# Patient Record
Sex: MALE | Race: WHITE | NOT HISPANIC OR LATINO
[De-identification: names, ages, dates, MRNs, and addresses within clinical notes are randomized per-mention and may not be internally consistent; named-entity substitution may affect disease eponyms.]

---

## 2017-01-23 ENCOUNTER — CHART COPY (OUTPATIENT)
Age: 72
End: 2017-01-23

## 2017-01-23 RX ORDER — OXYCODONE AND ACETAMINOPHEN 5; 325 MG/1; MG/1
5-325 TABLET ORAL
Qty: 60 | Refills: 0 | Status: ACTIVE | COMMUNITY
Start: 2017-01-23 | End: 1900-01-01

## 2021-01-12 ENCOUNTER — NON-APPOINTMENT (OUTPATIENT)
Age: 76
End: 2021-01-12

## 2021-01-12 DIAGNOSIS — K31.7 POLYP OF STOMACH AND DUODENUM: ICD-10-CM

## 2021-02-05 ENCOUNTER — APPOINTMENT (OUTPATIENT)
Dept: DISASTER EMERGENCY | Facility: CLINIC | Age: 76
End: 2021-02-05

## 2021-02-05 ENCOUNTER — TRANSCRIPTION ENCOUNTER (OUTPATIENT)
Age: 76
End: 2021-02-05

## 2021-02-06 NOTE — PRE PROCEDURE NOTE - PRE PROCEDURE EVALUATION
Attending Physician:      Alejandra                     Procedure: EUS    Indication for Procedure: gastric polyp  ________________________________________________________  PAST MEDICAL & SURGICAL HISTORY:  Hyperlipidemia    Shoulder pain, left    GERD (gastroesophageal reflux disease)    Hypertension    S/P T&amp;A (status post tonsillectomy and adenoidectomy)    S/P appendectomy  1953    S/P TURP  2008    S/P colonoscopy  negative      ALLERGIES:  No Known Allergies    HOME MEDICATIONS:  amLODIPine 5 mg oral tablet: 1 tab(s) orally once a day  omeprazole 40 mg oral delayed release capsule: 1 cap(s) orally once a day  pravastatin 40 mg oral tablet: 1 tab(s) orally once a day  Vitamin D3 400 intl units oral tablet: 1 tab(s) orally once a day    AICD/PPM: [ ] yes   [ ] no    PERTINENT LAB DATA:                      PHYSICAL EXAMINATION:    T(C): --  HR: --  BP: --  RR: --  SpO2: --    Constitutional: NAD  HEENT: PERRLA, EOMI,    Neck:  No JVD  Respiratory: CTAB/L  Cardiovascular: S1 and S2  Gastrointestinal: BS+, soft, NT/ND  Extremities: No peripheral edema  Neurological: A/O x 3, no focal deficits  Psychiatric: Normal mood, normal affect  Skin: No rashes    ASA Class: I [ ]  II [ ]  III [ ]  IV [ ]    COMMENTS:    The patient is a suitable candidate for the planned procedure unless box checked [ ]  No, explain:

## 2021-02-07 LAB — SARS-COV-2 N GENE NPH QL NAA+PROBE: NOT DETECTED

## 2021-02-08 ENCOUNTER — RESULT REVIEW (OUTPATIENT)
Age: 76
End: 2021-02-08

## 2021-02-08 ENCOUNTER — OUTPATIENT (OUTPATIENT)
Dept: OUTPATIENT SERVICES | Facility: HOSPITAL | Age: 76
LOS: 1 days | End: 2021-02-08
Payer: COMMERCIAL

## 2021-02-08 ENCOUNTER — APPOINTMENT (OUTPATIENT)
Dept: GASTROENTEROLOGY | Facility: HOSPITAL | Age: 76
End: 2021-02-08

## 2021-02-08 VITALS
HEART RATE: 81 BPM | DIASTOLIC BLOOD PRESSURE: 80 MMHG | OXYGEN SATURATION: 100 % | SYSTOLIC BLOOD PRESSURE: 116 MMHG | RESPIRATION RATE: 15 BRPM

## 2021-02-08 VITALS
RESPIRATION RATE: 20 BRPM | HEIGHT: 76 IN | SYSTOLIC BLOOD PRESSURE: 146 MMHG | OXYGEN SATURATION: 97 % | WEIGHT: 233.03 LBS | DIASTOLIC BLOOD PRESSURE: 89 MMHG | TEMPERATURE: 97 F | HEART RATE: 76 BPM

## 2021-02-08 DIAGNOSIS — K31.7 POLYP OF STOMACH AND DUODENUM: ICD-10-CM

## 2021-02-08 DIAGNOSIS — Z90.89 ACQUIRED ABSENCE OF OTHER ORGANS: Chronic | ICD-10-CM

## 2021-02-08 DIAGNOSIS — Z98.89 OTHER SPECIFIED POSTPROCEDURAL STATES: Chronic | ICD-10-CM

## 2021-02-08 DIAGNOSIS — Z90.79 ACQUIRED ABSENCE OF OTHER GENITAL ORGAN(S): Chronic | ICD-10-CM

## 2021-02-08 DIAGNOSIS — Z90.49 ACQUIRED ABSENCE OF OTHER SPECIFIED PARTS OF DIGESTIVE TRACT: Chronic | ICD-10-CM

## 2021-02-08 PROCEDURE — 88305 TISSUE EXAM BY PATHOLOGIST: CPT | Mod: 26

## 2021-02-08 PROCEDURE — 43251 EGD REMOVE LESION SNARE: CPT

## 2021-02-08 PROCEDURE — 43251 EGD REMOVE LESION SNARE: CPT | Mod: GC,59

## 2021-02-08 PROCEDURE — 88305 TISSUE EXAM BY PATHOLOGIST: CPT

## 2021-02-08 PROCEDURE — 43237 ENDOSCOPIC US EXAM ESOPH: CPT

## 2021-02-08 PROCEDURE — 43259 EGD US EXAM DUODENUM/JEJUNUM: CPT | Mod: GC

## 2021-02-08 RX ORDER — SODIUM CHLORIDE 9 MG/ML
500 INJECTION INTRAMUSCULAR; INTRAVENOUS; SUBCUTANEOUS
Refills: 0 | Status: COMPLETED | OUTPATIENT
Start: 2021-02-08 | End: 2021-02-08

## 2021-02-08 RX ADMIN — SODIUM CHLORIDE 30 MILLILITER(S): 9 INJECTION INTRAMUSCULAR; INTRAVENOUS; SUBCUTANEOUS at 14:00

## 2021-02-08 NOTE — PRE-ANESTHESIA EVALUATION ADULT - NSANTHOSAYNRD_GEN_A_CORE
No. AMADO screening performed.  STOP BANG Legend: 0-2 = LOW Risk; 3-4 = INTERMEDIATE Risk; 5-8 = HIGH Risk

## 2021-02-08 NOTE — ASU PATIENT PROFILE, ADULT - PSH
S/P appendectomy  1953  S/P colonoscopy  negative  S/P T&A (status post tonsillectomy and adenoidectomy)    S/P TURP  2008

## 2021-02-10 LAB — SURGICAL PATHOLOGY STUDY: SIGNIFICANT CHANGE UP

## 2021-02-16 ENCOUNTER — NON-APPOINTMENT (OUTPATIENT)
Age: 76
End: 2021-02-16

## 2022-08-30 NOTE — PRE-ANESTHESIA EVALUATION ADULT - NSPREOPDXFT_GEN_ALL_CORE
gastric nodules Detail Level: Zone Plan: I recommend broadspectrum sunscreen containing physical blockers such as zinc oxide / titanium dioxide at least SPF 30.

## 2023-01-04 ENCOUNTER — NON-APPOINTMENT (OUTPATIENT)
Age: 78
End: 2023-01-04

## 2023-09-14 ENCOUNTER — OUTPATIENT (OUTPATIENT)
Dept: OUTPATIENT SERVICES | Facility: HOSPITAL | Age: 78
LOS: 1 days | End: 2023-09-14
Payer: COMMERCIAL

## 2023-09-14 DIAGNOSIS — Z98.89 OTHER SPECIFIED POSTPROCEDURAL STATES: Chronic | ICD-10-CM

## 2023-09-14 DIAGNOSIS — Z90.89 ACQUIRED ABSENCE OF OTHER ORGANS: Chronic | ICD-10-CM

## 2023-09-14 DIAGNOSIS — Z90.79 ACQUIRED ABSENCE OF OTHER GENITAL ORGAN(S): Chronic | ICD-10-CM

## 2023-09-14 DIAGNOSIS — R00.2 PALPITATIONS: ICD-10-CM

## 2023-09-14 DIAGNOSIS — Z90.49 ACQUIRED ABSENCE OF OTHER SPECIFIED PARTS OF DIGESTIVE TRACT: Chronic | ICD-10-CM

## 2023-09-14 DIAGNOSIS — R00.1 BRADYCARDIA, UNSPECIFIED: ICD-10-CM

## 2023-09-14 PROCEDURE — 93016 CV STRESS TEST SUPVJ ONLY: CPT

## 2023-09-14 PROCEDURE — 93018 CV STRESS TEST I&R ONLY: CPT

## 2023-09-14 PROCEDURE — 93017 CV STRESS TEST TRACING ONLY: CPT

## 2024-08-01 ENCOUNTER — TRANSCRIPTION ENCOUNTER (OUTPATIENT)
Age: 79
End: 2024-08-01

## 2024-08-02 ENCOUNTER — OUTPATIENT (OUTPATIENT)
Dept: OUTPATIENT SERVICES | Facility: HOSPITAL | Age: 79
LOS: 1 days | End: 2024-08-02
Payer: COMMERCIAL

## 2024-08-02 DIAGNOSIS — Z90.79 ACQUIRED ABSENCE OF OTHER GENITAL ORGAN(S): Chronic | ICD-10-CM

## 2024-08-02 DIAGNOSIS — Z90.89 ACQUIRED ABSENCE OF OTHER ORGANS: Chronic | ICD-10-CM

## 2024-08-02 DIAGNOSIS — M17.0 BILATERAL PRIMARY OSTEOARTHRITIS OF KNEE: ICD-10-CM

## 2024-08-02 DIAGNOSIS — Z90.49 ACQUIRED ABSENCE OF OTHER SPECIFIED PARTS OF DIGESTIVE TRACT: Chronic | ICD-10-CM

## 2024-08-02 DIAGNOSIS — Z98.89 OTHER SPECIFIED POSTPROCEDURAL STATES: Chronic | ICD-10-CM

## 2024-08-02 PROCEDURE — 77002 NEEDLE LOCALIZATION BY XRAY: CPT

## 2024-08-02 PROCEDURE — 20610 DRAIN/INJ JOINT/BURSA W/O US: CPT | Mod: 50

## 2024-08-06 ENCOUNTER — APPOINTMENT (OUTPATIENT)
Dept: PULMONOLOGY | Facility: CLINIC | Age: 79
End: 2024-08-06

## 2024-08-06 PROBLEM — Z83.3 FAMILY HISTORY OF DIABETES MELLITUS: Status: ACTIVE | Noted: 2024-08-06

## 2024-08-06 PROBLEM — Z86.39 HISTORY OF HYPERCHOLESTEROLEMIA: Status: ACTIVE | Noted: 2024-08-06

## 2024-08-06 PROBLEM — R06.83 SNORING: Status: ACTIVE | Noted: 2024-08-06

## 2024-08-06 PROBLEM — K21.9 CHRONIC GERD: Status: ACTIVE | Noted: 2024-08-06

## 2024-08-06 PROBLEM — I10 BENIGN ESSENTIAL HTN: Status: ACTIVE | Noted: 2024-08-06

## 2024-08-06 PROBLEM — G47.19 EXCESSIVE DAYTIME SLEEPINESS: Status: ACTIVE | Noted: 2024-08-06

## 2024-08-06 PROBLEM — Z72.821 POOR SLEEP HYGIENE: Status: ACTIVE | Noted: 2024-08-06

## 2024-08-06 PROBLEM — Z82.5 FAMILY HISTORY OF EMPHYSEMA: Status: ACTIVE | Noted: 2024-08-06

## 2024-08-06 PROBLEM — Z87.891 FORMER SMOKER: Status: ACTIVE | Noted: 2024-08-06

## 2024-08-06 PROBLEM — Z87.438 HISTORY OF BPH: Status: ACTIVE | Noted: 2024-08-06

## 2024-08-06 PROCEDURE — 99204 OFFICE O/P NEW MOD 45 MIN: CPT

## 2024-08-06 NOTE — DISCUSSION/SUMMARY
[Obstructive Sleep Apnea] : obstructive sleep apnea [Nasal Polyps] : nasal polyps [Deviated Septum] : deviated septum [Turbinate Hypertrophy] : turbinate hypertrophy [Adenoid Hypertrophy] : adenoid hypertrophy [Sleep Study] : sleep study [Alcohol Avoidance] : alcohol avoidance [Sedative Avoidance] : sedative avoidance [Intra-Oral Device] : intra-oral device [de-identified] : HST [de-identified] : with poor sleep [de-identified] : The pathophysiology of sleep was explained to the patient in detail. Inclusive of this was the reasoning behind and the expected response to positive airway pressure therapy. Compliance was outlined including further followup

## 2024-08-06 NOTE — HISTORY OF PRESENT ILLNESS
[Awakes Unrefreshed] : awakes unrefreshed [Awakes with Dry Mouth] : awakes with dry mouth [Awakes with Headache] : awakes with headache [Snoring] : snoring [Witnessed Apneas] : witnessed apneas [TextBox_77] : 2000 [TextBox_79] : 2205 [TextBox_83] : 1-2hrs [TextBox_89] : 2 [TextBox_93] : up for 2-3 hrs during the day, poor sleep hygiene [ESS] : 9

## 2024-09-12 ENCOUNTER — APPOINTMENT (OUTPATIENT)
Dept: PULMONOLOGY | Facility: CLINIC | Age: 79
End: 2024-09-12
Payer: MEDICARE

## 2024-09-12 VITALS
RESPIRATION RATE: 16 BRPM | OXYGEN SATURATION: 97 % | HEART RATE: 74 BPM | DIASTOLIC BLOOD PRESSURE: 70 MMHG | SYSTOLIC BLOOD PRESSURE: 132 MMHG | HEIGHT: 72 IN | BODY MASS INDEX: 31.15 KG/M2 | WEIGHT: 230 LBS

## 2024-09-12 DIAGNOSIS — G47.33 OBSTRUCTIVE SLEEP APNEA (ADULT) (PEDIATRIC): ICD-10-CM

## 2024-09-12 DIAGNOSIS — G47.19 OTHER HYPERSOMNIA: ICD-10-CM

## 2024-09-12 DIAGNOSIS — R06.83 SNORING: ICD-10-CM

## 2024-09-12 DIAGNOSIS — I48.91 UNSPECIFIED ATRIAL FIBRILLATION: ICD-10-CM

## 2024-09-12 PROCEDURE — 99214 OFFICE O/P EST MOD 30 MIN: CPT

## 2024-09-12 NOTE — CONSULT LETTER
[Dear  ___] : Dear  [unfilled], [Consult Letter:] : I had the pleasure of evaluating your patient, [unfilled]. [Please see my note below.] : Please see my note below. [Consult Closing:] : Thank you very much for allowing me to participate in the care of this patient.  If you have any questions, please do not hesitate to contact me. [Sincerely,] : Sincerely, [FreeTextEntry3] : Tylor Galindo MD FCCP Pulmonary/Critical Care/Sleep Medicine Department of Internal Medicine  [facility]

## 2024-09-12 NOTE — HISTORY OF PRESENT ILLNESS
[Awakes Unrefreshed] : awakes unrefreshed [Awakes with Dry Mouth] : awakes with dry mouth [Awakes with Headache] : awakes with headache [Snoring] : snoring [Witnessed Apneas] : witnessed apneas [TextBox_77] : 2000 [TextBox_79] : 8517 [TextBox_83] : 1-2hrs [TextBox_89] : 2 [TextBox_93] : up for 2-3 hrs during the day, poor sleep hygiene [TextBox_100] : 8/15/2024 [TextBox_108] : 7.4 [TextBox_116] : 83% [TextBox_120] : TST 258min, REM 21.6, 1min afib [ESS] : 9

## 2024-09-12 NOTE — DISCUSSION/SUMMARY
[Obstructive Sleep Apnea] : obstructive sleep apnea [Nasal Polyps] : nasal polyps [Deviated Septum] : deviated septum [Turbinate Hypertrophy] : turbinate hypertrophy [Adenoid Hypertrophy] : adenoid hypertrophy [Sleep Study] : sleep study [Alcohol Avoidance] : alcohol avoidance [Sedative Avoidance] : sedative avoidance [Intra-Oral Device] : intra-oral device [de-identified] : with poor sleep [de-identified] : Sleep study poor due to frequent awakenings.  Probable underestimation of patient's sleep apnea [de-identified] : The pathophysiology of sleep was explained to the patient in detail. Inclusive of this was the reasoning behind and the expected response to positive airway pressure therapy. Compliance was outlined including further followup [de-identified] : Attended sleep study [FreeTextEntry1] : Episode of paroxysmal atrial fibrillation noted.  Patient referred back to his cardiologist for further evaluation

## 2024-10-05 ENCOUNTER — NON-APPOINTMENT (OUTPATIENT)
Age: 79
End: 2024-10-05

## 2024-10-24 ENCOUNTER — OUTPATIENT (OUTPATIENT)
Dept: OUTPATIENT SERVICES | Facility: HOSPITAL | Age: 79
LOS: 1 days | End: 2024-10-24
Payer: MEDICARE

## 2024-10-24 DIAGNOSIS — Z98.89 OTHER SPECIFIED POSTPROCEDURAL STATES: Chronic | ICD-10-CM

## 2024-10-24 DIAGNOSIS — Z90.79 ACQUIRED ABSENCE OF OTHER GENITAL ORGAN(S): Chronic | ICD-10-CM

## 2024-10-24 DIAGNOSIS — R06.83 SNORING: ICD-10-CM

## 2024-10-24 DIAGNOSIS — G47.33 OBSTRUCTIVE SLEEP APNEA (ADULT) (PEDIATRIC): ICD-10-CM

## 2024-10-24 DIAGNOSIS — Z90.89 ACQUIRED ABSENCE OF OTHER ORGANS: Chronic | ICD-10-CM

## 2024-10-24 DIAGNOSIS — Z90.49 ACQUIRED ABSENCE OF OTHER SPECIFIED PARTS OF DIGESTIVE TRACT: Chronic | ICD-10-CM

## 2024-10-24 PROCEDURE — 95810 POLYSOM 6/> YRS 4/> PARAM: CPT | Mod: 26

## 2024-10-24 PROCEDURE — 95810 POLYSOM 6/> YRS 4/> PARAM: CPT

## 2024-10-24 RX ORDER — ZOLPIDEM TARTRATE 5 MG/1
5 TABLET ORAL
Qty: 1 | Refills: 1 | Status: ACTIVE | COMMUNITY
Start: 2024-10-24 | End: 1900-01-01

## 2024-11-19 ENCOUNTER — APPOINTMENT (OUTPATIENT)
Dept: PULMONOLOGY | Facility: CLINIC | Age: 79
End: 2024-11-19
Payer: MEDICARE

## 2024-11-19 DIAGNOSIS — G47.33 OBSTRUCTIVE SLEEP APNEA (ADULT) (PEDIATRIC): ICD-10-CM

## 2024-11-19 DIAGNOSIS — Z71.89 OTHER SPECIFIED COUNSELING: ICD-10-CM

## 2024-11-19 PROCEDURE — 99443: CPT

## 2025-01-30 ENCOUNTER — APPOINTMENT (OUTPATIENT)
Dept: ELECTROPHYSIOLOGY | Facility: CLINIC | Age: 80
End: 2025-01-30
Payer: MEDICARE

## 2025-01-30 VITALS
WEIGHT: 230 LBS | HEIGHT: 72 IN | BODY MASS INDEX: 31.15 KG/M2 | HEART RATE: 90 BPM | OXYGEN SATURATION: 97 % | SYSTOLIC BLOOD PRESSURE: 134 MMHG | DIASTOLIC BLOOD PRESSURE: 71 MMHG

## 2025-01-30 DIAGNOSIS — F41.9 ANXIETY DISORDER, UNSPECIFIED: ICD-10-CM

## 2025-01-30 DIAGNOSIS — G89.29 DORSALGIA, UNSPECIFIED: ICD-10-CM

## 2025-01-30 DIAGNOSIS — G47.9 SLEEP DISORDER, UNSPECIFIED: ICD-10-CM

## 2025-01-30 DIAGNOSIS — M54.9 DORSALGIA, UNSPECIFIED: ICD-10-CM

## 2025-01-30 PROCEDURE — 99205 OFFICE O/P NEW HI 60 MIN: CPT | Mod: 25

## 2025-01-30 PROCEDURE — 93000 ELECTROCARDIOGRAM COMPLETE: CPT

## 2025-01-30 RX ORDER — APIXABAN 5 MG/1
5 TABLET, FILM COATED ORAL
Refills: 0 | Status: ACTIVE | COMMUNITY

## 2025-01-30 RX ORDER — MULTIVITAMIN 9 MG/15 ML
LIQUID ORAL
Refills: 0 | Status: ACTIVE | COMMUNITY
Start: 2025-01-30

## 2025-02-04 PROCEDURE — 93246 EXT ECG>7D<15D RECORDING: CPT

## 2025-02-18 ENCOUNTER — APPOINTMENT (OUTPATIENT)
Dept: PULMONOLOGY | Facility: CLINIC | Age: 80
End: 2025-02-18
Payer: MEDICARE

## 2025-02-18 VITALS
RESPIRATION RATE: 16 BRPM | WEIGHT: 230 LBS | HEART RATE: 73 BPM | DIASTOLIC BLOOD PRESSURE: 70 MMHG | SYSTOLIC BLOOD PRESSURE: 118 MMHG | OXYGEN SATURATION: 97 % | HEIGHT: 72 IN | BODY MASS INDEX: 31.15 KG/M2

## 2025-02-18 DIAGNOSIS — G47.33 OBSTRUCTIVE SLEEP APNEA (ADULT) (PEDIATRIC): ICD-10-CM

## 2025-02-18 DIAGNOSIS — R06.83 SNORING: ICD-10-CM

## 2025-02-18 DIAGNOSIS — Z71.89 OTHER SPECIFIED COUNSELING: ICD-10-CM

## 2025-02-18 PROCEDURE — 99214 OFFICE O/P EST MOD 30 MIN: CPT

## 2025-02-18 PROCEDURE — G2211 COMPLEX E/M VISIT ADD ON: CPT

## 2025-03-27 PROCEDURE — 93248 EXT ECG>7D<15D REV&INTERPJ: CPT

## 2025-04-23 PROBLEM — R00.2 PALPITATION: Status: ACTIVE | Noted: 2025-04-23

## 2025-04-23 PROBLEM — I48.0 PAROXYSMAL ATRIAL FIBRILLATION: Status: ACTIVE | Noted: 2025-04-23

## 2025-04-25 ENCOUNTER — INPATIENT (INPATIENT)
Facility: HOSPITAL | Age: 80
LOS: 3 days | Discharge: HOME | DRG: 379 | End: 2025-04-29
Attending: INTERNAL MEDICINE | Admitting: STUDENT IN AN ORGANIZED HEALTH CARE EDUCATION/TRAINING PROGRAM
Payer: COMMERCIAL

## 2025-04-25 VITALS
DIASTOLIC BLOOD PRESSURE: 68 MMHG | WEIGHT: 244.71 LBS | HEART RATE: 99 BPM | HEIGHT: 72 IN | RESPIRATION RATE: 20 BRPM | TEMPERATURE: 98 F | OXYGEN SATURATION: 98 % | SYSTOLIC BLOOD PRESSURE: 113 MMHG

## 2025-04-25 DIAGNOSIS — Z90.49 ACQUIRED ABSENCE OF OTHER SPECIFIED PARTS OF DIGESTIVE TRACT: Chronic | ICD-10-CM

## 2025-04-25 DIAGNOSIS — K92.2 GASTROINTESTINAL HEMORRHAGE, UNSPECIFIED: ICD-10-CM

## 2025-04-25 DIAGNOSIS — Z98.89 OTHER SPECIFIED POSTPROCEDURAL STATES: Chronic | ICD-10-CM

## 2025-04-25 DIAGNOSIS — Z90.79 ACQUIRED ABSENCE OF OTHER GENITAL ORGAN(S): Chronic | ICD-10-CM

## 2025-04-25 DIAGNOSIS — Z90.89 ACQUIRED ABSENCE OF OTHER ORGANS: Chronic | ICD-10-CM

## 2025-04-25 LAB
ABO RH CONFIRMATION: SIGNIFICANT CHANGE UP
ALBUMIN SERPL ELPH-MCNC: 3.5 G/DL — SIGNIFICANT CHANGE UP (ref 3.3–5.2)
ALP SERPL-CCNC: 51 U/L — SIGNIFICANT CHANGE UP (ref 40–120)
ALT FLD-CCNC: 21 U/L — SIGNIFICANT CHANGE UP
ANION GAP SERPL CALC-SCNC: 12 MMOL/L — SIGNIFICANT CHANGE UP (ref 5–17)
APPEARANCE UR: CLEAR — SIGNIFICANT CHANGE UP
APTT BLD: 40.8 SEC — HIGH (ref 26.1–36.8)
AST SERPL-CCNC: 23 U/L — SIGNIFICANT CHANGE UP
BASOPHILS # BLD AUTO: 0.05 K/UL — SIGNIFICANT CHANGE UP (ref 0–0.2)
BASOPHILS NFR BLD AUTO: 0.7 % — SIGNIFICANT CHANGE UP (ref 0–2)
BILIRUB SERPL-MCNC: 0.5 MG/DL — SIGNIFICANT CHANGE UP (ref 0.4–2)
BILIRUB UR-MCNC: NEGATIVE — SIGNIFICANT CHANGE UP
BLD GP AB SCN SERPL QL: SIGNIFICANT CHANGE UP
BUN SERPL-MCNC: 22.5 MG/DL — HIGH (ref 8–20)
CALCIUM SERPL-MCNC: 8 MG/DL — LOW (ref 8.4–10.5)
CHLORIDE SERPL-SCNC: 103 MMOL/L — SIGNIFICANT CHANGE UP (ref 96–108)
CO2 SERPL-SCNC: 24 MMOL/L — SIGNIFICANT CHANGE UP (ref 22–29)
COLOR SPEC: YELLOW — SIGNIFICANT CHANGE UP
CREAT SERPL-MCNC: 1.13 MG/DL — SIGNIFICANT CHANGE UP (ref 0.5–1.3)
DIFF PNL FLD: NEGATIVE — SIGNIFICANT CHANGE UP
EGFR: 67 ML/MIN/1.73M2 — SIGNIFICANT CHANGE UP
EGFR: 67 ML/MIN/1.73M2 — SIGNIFICANT CHANGE UP
EOSINOPHIL # BLD AUTO: 0.11 K/UL — SIGNIFICANT CHANGE UP (ref 0–0.5)
EOSINOPHIL NFR BLD AUTO: 1.6 % — SIGNIFICANT CHANGE UP (ref 0–6)
GLUCOSE SERPL-MCNC: 106 MG/DL — HIGH (ref 70–99)
GLUCOSE UR QL: NEGATIVE MG/DL — SIGNIFICANT CHANGE UP
HCT VFR BLD CALC: 27.1 % — LOW (ref 39–50)
HCT VFR BLD CALC: 29.7 % — LOW (ref 39–50)
HCT VFR BLD CALC: 31.3 % — LOW (ref 39–50)
HGB BLD-MCNC: 10.3 G/DL — LOW (ref 13–17)
HGB BLD-MCNC: 9 G/DL — LOW (ref 13–17)
HGB BLD-MCNC: 9.7 G/DL — LOW (ref 13–17)
IMM GRANULOCYTES # BLD AUTO: 0.02 K/UL — SIGNIFICANT CHANGE UP (ref 0–0.07)
IMM GRANULOCYTES NFR BLD AUTO: 0.3 % — SIGNIFICANT CHANGE UP (ref 0–0.9)
INR BLD: 1.23 RATIO — HIGH (ref 0.85–1.16)
KETONES UR-MCNC: NEGATIVE MG/DL — SIGNIFICANT CHANGE UP
LEUKOCYTE ESTERASE UR-ACNC: NEGATIVE — SIGNIFICANT CHANGE UP
LIDOCAIN IGE QN: 60 U/L — HIGH (ref 22–51)
LYMPHOCYTES # BLD AUTO: 0.86 K/UL — LOW (ref 1–3.3)
LYMPHOCYTES NFR BLD AUTO: 12.2 % — LOW (ref 13–44)
MCHC RBC-ENTMCNC: 30.3 PG — SIGNIFICANT CHANGE UP (ref 27–34)
MCHC RBC-ENTMCNC: 32.9 G/DL — SIGNIFICANT CHANGE UP (ref 32–36)
MCV RBC AUTO: 92.1 FL — SIGNIFICANT CHANGE UP (ref 80–100)
MONOCYTES # BLD AUTO: 0.72 K/UL — SIGNIFICANT CHANGE UP (ref 0–0.9)
MONOCYTES NFR BLD AUTO: 10.2 % — SIGNIFICANT CHANGE UP (ref 2–14)
NEUTROPHILS # BLD AUTO: 5.28 K/UL — SIGNIFICANT CHANGE UP (ref 1.8–7.4)
NEUTROPHILS NFR BLD AUTO: 75 % — SIGNIFICANT CHANGE UP (ref 43–77)
NITRITE UR-MCNC: NEGATIVE — SIGNIFICANT CHANGE UP
NRBC # BLD AUTO: 0 K/UL — SIGNIFICANT CHANGE UP (ref 0–0)
NRBC # FLD: 0 K/UL — SIGNIFICANT CHANGE UP (ref 0–0)
NRBC BLD AUTO-RTO: 0 /100 WBCS — SIGNIFICANT CHANGE UP (ref 0–0)
NT-PROBNP SERPL-SCNC: 70 PG/ML — SIGNIFICANT CHANGE UP (ref 0–300)
PH UR: 6 — SIGNIFICANT CHANGE UP (ref 5–8)
PLATELET # BLD AUTO: 216 K/UL — SIGNIFICANT CHANGE UP (ref 150–400)
PMV BLD: 10.1 FL — SIGNIFICANT CHANGE UP (ref 7–13)
POTASSIUM SERPL-MCNC: 4.2 MMOL/L — SIGNIFICANT CHANGE UP (ref 3.5–5.3)
POTASSIUM SERPL-SCNC: 4.2 MMOL/L — SIGNIFICANT CHANGE UP (ref 3.5–5.3)
PROT SERPL-MCNC: 5.7 G/DL — LOW (ref 6.6–8.7)
PROT UR-MCNC: NEGATIVE MG/DL — SIGNIFICANT CHANGE UP
PROTHROM AB SERPL-ACNC: 14.2 SEC — HIGH (ref 9.9–13.4)
RBC # BLD: 3.4 M/UL — LOW (ref 4.2–5.8)
RBC # FLD: 13.2 % — SIGNIFICANT CHANGE UP (ref 10.3–14.5)
SODIUM SERPL-SCNC: 139 MMOL/L — SIGNIFICANT CHANGE UP (ref 135–145)
SP GR SPEC: 1.01 — SIGNIFICANT CHANGE UP (ref 1–1.03)
TROPONIN T, HIGH SENSITIVITY RESULT: 14 NG/L — SIGNIFICANT CHANGE UP (ref 0–51)
UROBILINOGEN FLD QL: 0.2 MG/DL — SIGNIFICANT CHANGE UP (ref 0.2–1)
WBC # BLD: 7.04 K/UL — SIGNIFICANT CHANGE UP (ref 3.8–10.5)
WBC # FLD AUTO: 7.04 K/UL — SIGNIFICANT CHANGE UP (ref 3.8–10.5)

## 2025-04-25 PROCEDURE — 99223 1ST HOSP IP/OBS HIGH 75: CPT

## 2025-04-25 PROCEDURE — 74178 CT ABD&PLV WO CNTR FLWD CNTR: CPT | Mod: 26

## 2025-04-25 PROCEDURE — 93970 EXTREMITY STUDY: CPT | Mod: 26

## 2025-04-25 PROCEDURE — 93010 ELECTROCARDIOGRAM REPORT: CPT

## 2025-04-25 PROCEDURE — 99222 1ST HOSP IP/OBS MODERATE 55: CPT

## 2025-04-25 PROCEDURE — 99285 EMERGENCY DEPT VISIT HI MDM: CPT

## 2025-04-25 PROCEDURE — 33285 INSJ SUBQ CAR RHYTHM MNTR: CPT

## 2025-04-25 RX ORDER — SODIUM CHLORIDE 9 G/1000ML
500 INJECTION, SOLUTION INTRAVENOUS ONCE
Refills: 0 | Status: COMPLETED | OUTPATIENT
Start: 2025-04-25 | End: 2025-04-25

## 2025-04-25 RX ADMIN — SODIUM CHLORIDE 1000 MILLILITER(S): 9 INJECTION, SOLUTION INTRAVENOUS at 20:28

## 2025-04-25 RX ADMIN — SODIUM CHLORIDE 500 MILLILITER(S): 9 INJECTION, SOLUTION INTRAVENOUS at 14:03

## 2025-04-25 NOTE — CONSULT NOTE ADULT - SUBJECTIVE AND OBJECTIVE BOX
HPI:      PAST MEDICAL & SURGICAL HISTORY:  Hypertension      GERD (gastroesophageal reflux disease)      Shoulder pain, left      Hyperlipidemia      S/P colonoscopy  negative      S/P TURP  2008      S/P appendectomy  1953      S/P T&A (status post tonsillectomy and adenoidectomy)          ROS:  No Heartburn, regurgitation, dysphagia, odynophagia.  No dyspepsia  No abdominal pain.    No Nausea, vomiting.  + Bleeding.  No hematemesis.   No diarrhea.    No hematochesia.  No weight loss, anorexia.  No edema.      MEDICATIONS  (STANDING):  lactated ringers Bolus 500 milliLiter(s) IV Bolus once    MEDICATIONS  (PRN):      Allergies    No Known Allergies    Intolerances        SOCIAL HISTORY:    ENDOSCOPIC/GI HISTORY:    FAMILY HISTORY:  Family history of type 2 diabetes mellitus (Mother, Sibling)    Family history of stroke (Mother)    Family history of pacemaker (Father)    Family history of hypertension (Father)    Family history of COPD (chronic obstructive pulmonary disease) (Father)    Family history of heart disease (Father)        Vital Signs Last 24 Hrs  T(C): 36.7 (25 Apr 2025 10:56), Max: 36.7 (25 Apr 2025 10:56)  T(F): 98 (25 Apr 2025 10:56), Max: 98 (25 Apr 2025 10:56)  HR: 99 (25 Apr 2025 10:56) (99 - 99)  BP: 113/68 (25 Apr 2025 10:56) (113/68 - 113/68)  BP(mean): --  RR: 20 (25 Apr 2025 10:56) (20 - 20)  SpO2: 98% (25 Apr 2025 10:56) (98% - 98%)    Parameters below as of 25 Apr 2025 10:56  Patient On (Oxygen Delivery Method): room air        PHYSICAL EXAM:    GENERAL: NAD, well-groomed, well-developed  HEAD:  Atraumatic, Normocephalic  EYES: EOMI, PERRLA, conjunctiva and sclera clear  ENMT: No tonsillar erythema, exudates, or enlargement; Moist mucous membranes, Good dentition, No lesions  NECK: Supple, No JVD, Normal thyroid  CHEST/LUNG: Clear to percussion bilaterally; No rales, rhonchi, wheezing, or rubs  HEART: Regular rate and rhythm; No murmurs, rubs, or gallops  ABDOMEN: Soft, Nontender, Nondistended; Bowel sounds present  EXTREMITIES:  2+ Peripheral Pulses, No clubbing, cyanosis, or edema  LYMPH: No lymphadenopathy noted  SKIN: No rashes or lesions      LABS:                        9.7    x     )-----------( x        ( 25 Apr 2025 17:03 )             29.7     04-25    139  |  103  |  22.5[H]  ----------------------------<  106[H]  4.2   |  24.0  |  1.13    Ca    8.0[L]      25 Apr 2025 14:00    TPro  5.7[L]  /  Alb  3.5  /  TBili  0.5  /  DBili  x   /  AST  23  /  ALT  21  /  AlkPhos  51  04-25    PT/INR - ( 25 Apr 2025 14:00 )   PT: 14.2 sec;   INR: 1.23 ratio         PTT - ( 25 Apr 2025 14:00 )  PTT:40.8 sec   Urinalysis Basic - ( 25 Apr 2025 14:00 )    Color: x / Appearance: x / SG: x / pH: x  Gluc: 106 mg/dL / Ketone: x  / Bili: x / Urobili: x   Blood: x / Protein: x / Nitrite: x   Leuk Esterase: x / RBC: x / WBC x   Sq Epi: x / Non Sq Epi: x / Bacteria: x        LIVER FUNCTIONS - ( 25 Apr 2025 14:00 )  Alb: 3.5 g/dL / Pro: 5.7 g/dL / ALK PHOS: 51 U/L / ALT: 21 U/L / AST: 23 U/L / GGT: x               RADIOLOGY & ADDITIONAL STUDIES:   HPI:Patient with known history of colonic diverticulosis and now with lower GI bleeding.  He has woken up in the morning and had multiple episodes of black/dark-colored stools.  He has history of CKD, A-fib on Eliquis, hypertension, dyslipidemia and has been having lower extremity edema.  He had a colonoscopy about a month ago with Dr. Chong.  He takes occasionally Aleve.  Denies any other complaints.  He is scheduled for CT angio to rule out GI bleeding.      PAST MEDICAL & SURGICAL HISTORY:  Hypertension      GERD (gastroesophageal reflux disease)      Shoulder pain, left      Hyperlipidemia      S/P colonoscopy  negative      S/P TURP  2008      S/P appendectomy  1953      S/P T&A (status post tonsillectomy and adenoidectomy)          ROS:  No Heartburn, regurgitation, dysphagia, odynophagia.  No dyspepsia  No abdominal pain.    No Nausea, vomiting.  + Bleeding.  No hematemesis.   No diarrhea.    No hematochesia.  No weight loss, anorexia.  No edema.      MEDICATIONS  (STANDING):  lactated ringers Bolus 500 milliLiter(s) IV Bolus once    MEDICATIONS  (PRN):      Allergies    No Known Allergies    Intolerances        SOCIAL HISTORY:    ENDOSCOPIC/GI HISTORY:    FAMILY HISTORY:  Family history of type 2 diabetes mellitus (Mother, Sibling)    Family history of stroke (Mother)    Family history of pacemaker (Father)    Family history of hypertension (Father)    Family history of COPD (chronic obstructive pulmonary disease) (Father)    Family history of heart disease (Father)        Vital Signs Last 24 Hrs  T(C): 36.7 (25 Apr 2025 10:56), Max: 36.7 (25 Apr 2025 10:56)  T(F): 98 (25 Apr 2025 10:56), Max: 98 (25 Apr 2025 10:56)  HR: 99 (25 Apr 2025 10:56) (99 - 99)  BP: 113/68 (25 Apr 2025 10:56) (113/68 - 113/68)  BP(mean): --  RR: 20 (25 Apr 2025 10:56) (20 - 20)  SpO2: 98% (25 Apr 2025 10:56) (98% - 98%)    Parameters below as of 25 Apr 2025 10:56  Patient On (Oxygen Delivery Method): room air        PHYSICAL EXAM:    GENERAL: NAD, well-groomed, well-developed  HEAD:  Atraumatic, Normocephalic  EYES: EOMI, PERRLA, conjunctiva and sclera clear  ENMT: No tonsillar erythema, exudates, or enlargement; Moist mucous membranes, Good dentition, No lesions  NECK: Supple, No JVD, Normal thyroid  CHEST/LUNG: Clear to percussion bilaterally; No rales, rhonchi, wheezing, or rubs  HEART: Regular rate and rhythm; No murmurs, rubs, or gallops  ABDOMEN: Soft, Nontender, Nondistended; Bowel sounds present  EXTREMITIES:  2+ Peripheral Pulses, No clubbing, cyanosis, or edema  LYMPH: No lymphadenopathy noted  SKIN: No rashes or lesions      LABS:                        9.7    x     )-----------( x        ( 25 Apr 2025 17:03 )             29.7     04-25    139  |  103  |  22.5[H]  ----------------------------<  106[H]  4.2   |  24.0  |  1.13    Ca    8.0[L]      25 Apr 2025 14:00    TPro  5.7[L]  /  Alb  3.5  /  TBili  0.5  /  DBili  x   /  AST  23  /  ALT  21  /  AlkPhos  51  04-25    PT/INR - ( 25 Apr 2025 14:00 )   PT: 14.2 sec;   INR: 1.23 ratio         PTT - ( 25 Apr 2025 14:00 )  PTT:40.8 sec   Urinalysis Basic - ( 25 Apr 2025 14:00 )    Color: x / Appearance: x / SG: x / pH: x  Gluc: 106 mg/dL / Ketone: x  / Bili: x / Urobili: x   Blood: x / Protein: x / Nitrite: x   Leuk Esterase: x / RBC: x / WBC x   Sq Epi: x / Non Sq Epi: x / Bacteria: x        LIVER FUNCTIONS - ( 25 Apr 2025 14:00 )  Alb: 3.5 g/dL / Pro: 5.7 g/dL / ALK PHOS: 51 U/L / ALT: 21 U/L / AST: 23 U/L / GGT: x               RADIOLOGY & ADDITIONAL STUDIES:  < from: US Duplex Venous Lower Ext Complete, Bilateral (04.25.25 @ 16:23) >    ACC: [id number] EXAM:  US DPLX LWR EXT VEINS COMPL BI   ORDERED BY: TONIA GRULLON     PROCEDURE DATE:  04/25/2025          INTERPRETATION:  CLINICAL INFORMATION: Evaluate for DVT. Lower extremity   swelling.    COMPARISON: None available.    TECHNIQUE: Duplex sonography of the BILATERAL LOWER extremity veins with   color and spectral Doppler, with and without compression.    FINDINGS:    RIGHT:  Normal compressibility of the RIGHT common femoral, femoral and popliteal   veins.  Doppler examination shows normal spontaneous and phasic flow.  No RIGHT calf vein thrombosis is detected.    LEFT:  Normal compressibility of the LEFT common femoral, femoral and popliteal   veins.  Doppler examination shows normal spontaneous and phasic flow.  No LEFT calf vein thrombosis is detected.    IMPRESSION:  No evidence of deep venous thrombosis in either lower extremity.            --- End of Report ---            EVAN MYA MD; Attending Radiologist  This document has been electronically signed. Apr 25 2025  4:25PM    < end of copied text >

## 2025-04-25 NOTE — ED PROVIDER NOTE - CLINICAL SUMMARY MEDICAL DECISION MAKING FREE TEXT BOX
History and physical as above.  CT imaging to eval for active rectal bleeding, DVT study given extremity pain.  Patient signed out to oncoming provider CT results and admission

## 2025-04-25 NOTE — ED PROVIDER NOTE - IV ALTEPLASE INCLUSION HIDDEN
"Time reflects when diagnosis was documented in both MDM as applicable and the Disposition within this note       Time User Action Codes Description Comment    12/25/2024 12:45 PM Bob Ayers [I65.22] Stenosis of left carotid artery     12/25/2024 12:46 PM Bob Ayers [I63.9] CVA (cerebral vascular accident) (HCC)     12/25/2024 12:47 PM Bob Ayers [R53.1] Right sided weakness     12/25/2024 12:48 PM Bob Ayers [W19.XXXA] Fall, initial encounter     12/25/2024  1:32 PM Bob Ayers [R26.2] Ambulatory dysfunction           ED Disposition       ED Disposition   Admit    Condition   Stable    Date/Time   Wed Dec 25, 2024  1:32 PM    Comment   Case was discussed with DELFINO and the patient's admission status was agreed to be Admission Status: observation status to the service of Dr. Darling .               Assessment & Plan       Medical Decision Making  69yoM, CABG, presenting with stuttering course rt sided paresthesia weakness over last month time - found to have 4/5 strength rt sided extremities here. Report of fall 2/2 ambulatory dysfunction (no trauma) prehospital. CTA shows lt sided carotid stenosis and age indetermind infarct consistent with exam. Neuro note to be placed. Permissed -200 but should he go below, no pressors. ASA/Plavix and load given here. I also placed consult to vascular but neuro noted no intervention via them acutely. Pt/wife aware. Requested admit on stroke pathway.  Care accepted by hospital medicine.     Of note, prehospital was stated that the patient potentially had decreased  strength in the right extremity.  Their main complaint was that the patient was \"off\".  It was relayed via EMS the patient may have had onset of weakness in the right upper extremity 30 minutes prior to arrival however EMS was unclear of this timeline secondary to difficulty obtaining clear timeline in the home.  Prehospital stroke alert was not requested however prompt evaluation was " "requested to the door.  Patient was evaluated at the door and given deficits stroke alert was called immediately.    Amount and/or Complexity of Data Reviewed  Independent Historian: spouse and EMS  External Data Reviewed: notes.  Labs: ordered.  Radiology: ordered.  ECG/medicine tests: ordered and independent interpretation performed.     Details: EKG sinus, right bundle branch block.  Various ST segment changes as compared to prior, April 2021.  Patient without chest pain, shortness of breath, or any other symptoms of ACS at this time.    Risk  OTC drugs.  Prescription drug management.  Decision regarding hospitalization.        ED Course as of 12/25/24 1404   Wed Dec 25, 2024   1246 Results reviewed with stroke neurologist. Pt may be symptomatic 2/2 lt carotid stenosis vs age independermindate (via HPI suspect 48-72hrs) lt globus pallidus infarction. ASA/Plavix loading dose requested. Provided. Pt already took 81 ASA this AM. Consult to vascular placed. Dr. French, stroke neuro, notes no intervention by their team required in acute setting. Pt to be admitted to hospital medicine. Pt/family made aware.        Medications   iohexol (OMNIPAQUE) 350 MG/ML injection (SINGLE-DOSE) 75 mL (75 mL Intravenous Given 12/25/24 1222)   aspirin chewable tablet 243 mg (243 mg Oral Given 12/25/24 1308)   clopidogrel (PLAVIX) tablet 300 mg (300 mg Oral Given 12/25/24 1308)       ED Risk Strat Scores                                              History of Present Illness       Chief Complaint   Patient presents with    CVA/TIA-like Symptoms     Pt arrives via EMS from home with \"not being himself\" for days. Wife reports to EMS right hand weakness that started today at 1130       Past Medical History:   Diagnosis Date    CAD (coronary artery disease)     Cancer (HCC)     basal skin ca    Colon polyp     Coronary artery disease     Former tobacco use     History of dysphagia     resolved    Hyperlipidemia     Pre-diabetes       Past " Surgical History:   Procedure Laterality Date    CARDIAC CATHETERIZATION      COLONOSCOPY N/A 2018    Procedure: COLONOSCOPY;  Surgeon: Sven Payne MD;  Location: [facility];  Service: Gastroenterology    CORONARY ARTERY BYPASS GRAFT  2021    EGD  2019    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    [place] CORONARY ARTERY BYP W/VEIN & ARTERY GRAFT 3 VEIN N/A 2021    Procedure: CORONARY ARTERY BYPASS GRAFT (CABG) x 3; LIMA to LAD; Left EVH/SVG to OM1 and D1;  Surgeon: ALLISON Conde MD;  Location:  MAIN OR;  Service: Cardiac Surgery    [place] XCAPSL CTRC RMVL INSJ IO LENS PROSTH W/O ECP Right 2023    Procedure: EXTRACTION EXTRACAPSULAR CATARACT PHACO INTRAOCULAR LENS (IOL);  Surgeon: Huy Barton MD;  Location: [facility] OR;  Service: Ophthalmology    SKIN BIOPSY        Family History   Problem Relation Age of Onset    Heart attack Father 57    No Known Problems Mother     Prostate cancer Neg Hx     Colon cancer Neg Hx     Diabetes Neg Hx     Stroke Neg Hx       Social History     Tobacco Use    Smoking status: Former     Current packs/day: 0.00     Average packs/day: 1 pack/day for 10.0 years (10.0 ttl pk-yrs)     Types: Cigarettes     Start date:      Quit date:      Years since quittin.0    Smokeless tobacco: Never    Tobacco comments:     quit    Vaping Use    Vaping status: Never Used   Substance Use Topics    Alcohol use: Not Currently    Drug use: No      E-Cigarette/Vaping    E-Cigarette Use Never User       E-Cigarette/Vaping Substances    Nicotine No     THC No     CBD No     Flavoring No       I have reviewed and agree with the history as documented.     69-year-old male, past medical history CABG, presenting to the emergency department with right-sided upper and lower extremity paresthesia and weakness and stuttering over the last weeks time culminating in ambulatory dysfunction and fall today prehospital.  Information as a pertains to the  "fall was learned later into the patient's course of care in the emergency department.  Initially the call was received that he was with onset of weakness in the right upper extremity and that he had \"not been acting himself\" via EMS per wife.  On arrival, patient with 4 out of 5 strength in the right upper and right lower extremity.  He denied any other pain.  Denied headache, change in vision, ambulatory dysfunction, drug or alcohol use, or any other complaint at this time.      CVA/TIA-like Symptoms  Associated symptoms: no chest pain, no fever, no seizures and no vomiting        Review of Systems   Constitutional:  Negative for chills and fever.   HENT:  Negative for ear pain and sore throat.    Eyes:  Negative for pain and visual disturbance.   Respiratory:  Negative for cough and shortness of breath.    Cardiovascular:  Negative for chest pain and palpitations.   Gastrointestinal:  Negative for abdominal pain and vomiting.   Genitourinary:  Negative for dysuria and hematuria.   Musculoskeletal:  Negative for arthralgias and back pain.   Skin:  Negative for color change and rash.   Neurological:  Negative for seizures and syncope.   All other systems reviewed and are negative.          Objective       ED Triage Vitals   Temp Pulse Blood Pressure Respirations SpO2 Patient Position - Orthostatic VS   -- 12/25/24 1218 12/25/24 1218 12/25/24 1218 12/25/24 1212 12/25/24 1218    67 142/73 18 100 % Sitting      Temp src Heart Rate Source BP Location FiO2 (%) Pain Score    -- 12/25/24 1218 12/25/24 1218 -- --     Monitor Right arm        Vitals      Date and Time Temp Pulse SpO2 Resp BP Pain Score FACES Pain Rating User   12/25/24 1330 -- 62 97 % 18 141/70 -- -- NP   12/25/24 1245 -- 69 98 % 21 143/75 -- -- MF   12/25/24 1230 -- 76 97 % 18 138/63 -- -- MF   12/25/24 1218 -- -- -- 18 -- -- -- TB   12/25/24 1218 -- 67 97 % -- 142/73 -- -- NP   12/25/24 1212 -- -- 100 % -- -- -- -- TB            Physical Exam  Vitals and " nursing note reviewed.   Constitutional:       General: He is not in acute distress.     Appearance: He is well-developed.   HENT:      Head: Normocephalic and atraumatic.   Eyes:      Conjunctiva/sclera: Conjunctivae normal.   Cardiovascular:      Rate and Rhythm: Normal rate and regular rhythm.      Heart sounds: No murmur heard.  Pulmonary:      Effort: Pulmonary effort is normal. No respiratory distress.      Breath sounds: Normal breath sounds.   Abdominal:      Palpations: Abdomen is soft.      Tenderness: There is no abdominal tenderness.   Musculoskeletal:         General: No swelling.      Cervical back: Neck supple.   Skin:     General: Skin is warm and dry.      Capillary Refill: Capillary refill takes less than 2 seconds.   Neurological:      Mental Status: He is alert.      Comments: AAOX4. CN intact. Gross vision intact all 4 quadrants. Cerebellar signs with finger to nose and heel to shin intact. LUE 5/5 strength.  Right upper extremity with pronator drift and drift out of bed ultimately 4-5 strength.  Gross sensation of the right upper extremity appreciated along all dermatomes.  Right lower extremity also with 4-5 strength as compared to left lower extremity.  Gross sensation appreciated face and extremities.      Psychiatric:         Mood and Affect: Mood normal.         Results Reviewed       Procedure Component Value Units Date/Time    Protime-INR [[id number]]  (Normal) Collected: 12/25/24 1312    Lab Status: Final result Specimen: Blood from Arm, Left Updated: 12/25/24 1331     Protime 13.9 seconds      INR 1.00    Narrative:      INR Therapeutic Range    Indication                                             INR Range      Atrial Fibrillation                                               2.0-3.0  Hypercoagulable State                                    2.0.2.3  Left Ventricular Asist Device                            2.0-3.0  Mechanical Heart Valve                                  -     Aortic(with afib, MI, embolism, HF, LA enlargement,    and/or coagulopathy)                                     2.0-3.0 (2.5-3.5)     Mitral                                                             2.5-3.5  Prosthetic/Bioprosthetic Heart Valve               2.0-3.0  Venous thromboembolism (VTE: VT, PE        2.0-3.0    APTT [[id number]]  (Normal) Collected: 12/25/24 1312    Lab Status: Final result Specimen: Blood from Arm, Left Updated: 12/25/24 1331     PTT 24 seconds     HS Troponin 0hr (reflex protocol) [[id number]]  (Normal) Collected: 12/25/24 1224    Lab Status: Final result Specimen: Blood from Arm, Left Updated: 12/25/24 1258     hs TnI 0hr 5 ng/L     HS Troponin I 2hr [[id number]]     Lab Status: No result Specimen: Blood     Basic metabolic panel [[id number]] Collected: 12/25/24 1224    Lab Status: Final result Specimen: Blood from Arm, Left Updated: 12/25/24 1249     Sodium 136 mmol/L      Potassium 4.2 mmol/L      Chloride 100 mmol/L      CO2 28 mmol/L      ANION GAP 8 mmol/L      BUN 22 mg/dL      Creatinine 1.14 mg/dL      Glucose 91 mg/dL      Calcium 9.9 mg/dL      eGFR 66 ml/min/1.73sq m     Narrative:      National Kidney Disease Foundation guidelines for Chronic Kidney Disease (CKD):     Stage 1 with normal or high GFR (GFR > 90 mL/min/1.73 square meters)    Stage 2 Mild CKD (GFR = 60-89 mL/min/1.73 square meters)    Stage 3A Moderate CKD (GFR = 45-59 mL/min/1.73 square meters)    Stage 3B Moderate CKD (GFR = 30-44 mL/min/1.73 square meters)    Stage 4 Severe CKD (GFR = 15-29 mL/min/1.73 square meters)    Stage 5 End Stage CKD (GFR <15 mL/min/1.73 square meters)  Note: GFR calculation is accurate only with a steady state creatinine    CBC and Platelet [[id number]]  (Abnormal) Collected: 12/25/24 1224    Lab Status: Final result Specimen: Blood from Arm, Left Updated: 12/25/24 1234     WBC 11.64 Thousand/uL      RBC 5.23 Million/uL      Hemoglobin 14.9 g/dL      Hematocrit 46.3 %      MCV 89 fL       MCH 28.5 pg      MCHC 32.2 g/dL      RDW 14.2 %      Platelets 231 Thousands/uL      MPV 9.5 fL     Fingerstick Glucose (POCT) [[id number]]  (Normal) Collected: 12/25/24 1219    Lab Status: Final result Specimen: Blood Updated: 12/25/24 1219     POC Glucose 88 mg/dl             CT stroke alert brain   Final Interpretation by Ghulam Ladd MD (12/25 1239)         1. Loss of normal gray-white matter differentiation in the left globus pallidus (series 2, image 21) worrisome for age-indeterminate left middle cerebral artery infarction.   2. No acute intracranial hemorrhage.   3. Mild microangiopathy, also age indeterminant without the benefit of prior imaging..         Workstation performed: [id number]         CTA stroke alert (head/neck)   Final Interpretation by Ghulam Ladd MD (12/25 3886)      1. At least 90% focal high-grade stenosis left internal carotid artery origin. Vascular surgery assessment advised.   2. Nonvisualization of the intradural segment of the right vertebral artery possibly related to vascular occlusion or flow that is too slow to detect.   3. Wisp like enhancement of the extradural right vertebral artery possibly on the basis of developmental hypoplasia and/or severe stenosis.      I personally provided preliminary results of this study with Dr. Markie French and Bob Ayers on 12/25/2024 at 12:38 p.m.      Workstation performed: [id number]             CriticalCare Time    Date/Time: 12/25/2024 2:04 PM    Performed by: Bob Ayers DO  Authorized by: Bob Ayers DO    Critical care provider statement:     Critical care time (minutes):  50    Critical care time was exclusive of:  Separately billable procedures and treating other patients    Critical care was necessary to treat or prevent imminent or life-threatening deterioration of the following conditions:  CNS failure or compromise    Critical care was time spent personally by me on the following activities:  Obtaining history from  patient or surrogate, development of treatment plan with patient or surrogate, discussions with consultants, evaluation of patient's response to treatment, examination of patient, re-evaluation of patient's condition, review of old charts, ordering and review of radiographic studies, ordering and review of laboratory studies and ordering and performing treatments and interventions    I assumed direction of critical care for this patient from another provider in my specialty: no        ED Medication and Procedure Management   Prior to Admission Medications   Prescriptions Last Dose Informant Patient Reported? Taking?   Multiple Vitamin (multivitamin) tablet  Self Yes No   Sig: Take 1 tablet by mouth daily   ascorbic acid (VITAMIN C) 500 mg tablet  Self Yes No   Sig: Take 500 mg by mouth daily   aspirin (ECOTRIN LOW STRENGTH) 81 mg EC tablet   No No   Sig: Take 1 tablet (81 mg total) by mouth daily   atorvastatin (LIPITOR) 20 mg tablet   No No   Sig: Take 1 tablet (20 mg total) by mouth daily with dinner   cholecalciferol (VITAMIN D3) 1,000 units tablet  Self Yes No   Sig: Take 1,000 Units by mouth daily   co-enzyme Q-10 30 MG capsule  Self Yes No   Sig: Take 200 mg by mouth daily      Facility-Administered Medications: None     Patient's Medications   Discharge Prescriptions    No medications on file     No discharge procedures on file.  ED SEPSIS DOCUMENTATION   Time reflects when diagnosis was documented in both MDM as applicable and the Disposition within this note       Time User Action Codes Description Comment    12/25/2024 12:45 PM Bob Ayers [I65.22] Stenosis of left carotid artery     12/25/2024 12:46 PM Bob Ayers [I63.9] CVA (cerebral vascular accident) (HCC)     12/25/2024 12:47 PM Bob Ayers [R53.1] Right sided weakness     12/25/2024 12:48 PM Bob Ayers [W19.XXXA] Fall, initial encounter     12/25/2024  1:32 PM Bob Ayers [R26.2] Ambulatory dysfunction                   Bob Ayers, DO  12/25/24 1401     show

## 2025-04-25 NOTE — ED PROVIDER NOTE - OBJECTIVE STATEMENT
80-year-old male past medical history of CKD A-fib on Eliquis, hypertension on Norvasc and losartan, hyperlipidemia presents with bilateral lower extremity edema since wednesday associated with LE pain L>R, and hematochezia beginning today. Patient states he noticed LE edema Wednesday, went to pcp who was started on furosemide 20 mg daily which patient started yesterday.  States this morning he had multiple episodes of bright red blood per rectum initially in the stool but now just blood.  Endorsing feeling a bit lightheaded, had a colonoscopy recently, within the last few years which was unremarkable.  Takes occasional Aleve for arthralgias after golfing.  No chest pain or shortness of breath. Also with L groin pain since yesterday.

## 2025-04-25 NOTE — ED ADULT NURSE NOTE - OBJECTIVE STATEMENT
Pt is AxOx3 c/o bloody stool and lower leg swelling, the bloody stools started yesterday. Pt went to pcp and was put on a "water pill" for b/l LLE swelling. breathing even and unlabored, walks with steady gait. Denies SOB, CP,Pt is aware of plan of care.

## 2025-04-25 NOTE — ED PROVIDER NOTE - PROGRESS NOTE DETAILS
I received signout on this patient who is pending results of his CT scan.  CT scan does not show active bleeding.  I did discuss the findings of the prostate lesion with him.  Hemoglobin with only 1 g drop.  Case discussed with Dr. Hernandez, plan to keep hemoglobin above 8.  Patient consented for blood as needed.  Plan to admit to telemetry after discussion with Dr. Persaud.  Michael Landaverde MD.

## 2025-04-25 NOTE — ED PROVIDER NOTE - PHYSICAL EXAMINATION
PHYSICAL EXAM:   General: well-appearing, appears stated age, not in extremis   HEENT: NC/AT,  airway patent  Cardiovascular: regular rate and rhythm, + S1/S2, no murmurs, rubs, gallops appreciated  Respiratory: clear to auscultation bilaterally, good aeration bilaterally, nonlabored respirations  Abdominal: soft, +LLQ TTP, nondistended, no rebound, guarding or rigidity  Rectal: performed with chaperone MD Periera: dried blood around anus, no active bleeding. : normal external genitalia, no testicular TTP, +L inguinal TTP no palpable masses.  Extremities: +1 LE edema b/l. L calf TTP  Neuro: Alert and oriented x3.   Psychiatric: appropriate mood and affect. .   -Josiane Flores MD Attending Physician

## 2025-04-25 NOTE — ED ADULT TRIAGE NOTE - CHIEF COMPLAINT QUOTE
Pt arrives to ED c/o lower extremity pitting edema + bloody stool since this morning - on Eliquis and water pill

## 2025-04-25 NOTE — ED ADULT NURSE REASSESSMENT NOTE - NS ED NURSE REASSESS COMMENT FT1
report received from [facility], pt axo4 showing o signs of distress, awaiting ctr for possible admit

## 2025-04-26 LAB
A1C WITH ESTIMATED AVERAGE GLUCOSE RESULT: 5.9 % — HIGH (ref 4–5.6)
ANION GAP SERPL CALC-SCNC: 13 MMOL/L — SIGNIFICANT CHANGE UP (ref 5–17)
APTT BLD: 33.7 SEC — SIGNIFICANT CHANGE UP (ref 26.1–36.8)
BUN SERPL-MCNC: 22 MG/DL — HIGH (ref 8–20)
CALCIUM SERPL-MCNC: 7.9 MG/DL — LOW (ref 8.4–10.5)
CHLORIDE SERPL-SCNC: 106 MMOL/L — SIGNIFICANT CHANGE UP (ref 96–108)
CHOLEST SERPL-MCNC: 100 MG/DL — SIGNIFICANT CHANGE UP
CO2 SERPL-SCNC: 21 MMOL/L — LOW (ref 22–29)
CREAT SERPL-MCNC: 1.22 MG/DL — SIGNIFICANT CHANGE UP (ref 0.5–1.3)
EGFR: 61 ML/MIN/1.73M2 — SIGNIFICANT CHANGE UP
EGFR: 61 ML/MIN/1.73M2 — SIGNIFICANT CHANGE UP
ESTIMATED AVERAGE GLUCOSE: 123 MG/DL — HIGH (ref 68–114)
GAS PNL BLDV: SIGNIFICANT CHANGE UP
GLUCOSE SERPL-MCNC: 136 MG/DL — HIGH (ref 70–99)
HCT VFR BLD CALC: 24.1 % — LOW (ref 39–50)
HCT VFR BLD CALC: 27.1 % — LOW (ref 39–50)
HDLC SERPL-MCNC: 33 MG/DL — LOW
HGB BLD-MCNC: 8.1 G/DL — LOW (ref 13–17)
HGB BLD-MCNC: 8.9 G/DL — LOW (ref 13–17)
INR BLD: 1.2 RATIO — HIGH (ref 0.85–1.16)
LACTATE BLDV-MCNC: 2.5 MMOL/L — HIGH (ref 0.5–2)
LDLC SERPL-MCNC: 47 MG/DL — SIGNIFICANT CHANGE UP
LIPID PNL WITH DIRECT LDL SERPL: 47 MG/DL — SIGNIFICANT CHANGE UP
MAGNESIUM SERPL-MCNC: 1.9 MG/DL — SIGNIFICANT CHANGE UP (ref 1.6–2.6)
MCHC RBC-ENTMCNC: 30.2 PG — SIGNIFICANT CHANGE UP (ref 27–34)
MCHC RBC-ENTMCNC: 32.8 G/DL — SIGNIFICANT CHANGE UP (ref 32–36)
MCV RBC AUTO: 91.9 FL — SIGNIFICANT CHANGE UP (ref 80–100)
NONHDLC SERPL-MCNC: 67 MG/DL — SIGNIFICANT CHANGE UP
NRBC # BLD AUTO: 0 K/UL — SIGNIFICANT CHANGE UP (ref 0–0)
NRBC # FLD: 0 K/UL — SIGNIFICANT CHANGE UP (ref 0–0)
NRBC BLD AUTO-RTO: 0 /100 WBCS — SIGNIFICANT CHANGE UP (ref 0–0)
PHOSPHATE SERPL-MCNC: 3.2 MG/DL — SIGNIFICANT CHANGE UP (ref 2.4–4.7)
PLATELET # BLD AUTO: 244 K/UL — SIGNIFICANT CHANGE UP (ref 150–400)
PMV BLD: 10.1 FL — SIGNIFICANT CHANGE UP (ref 7–13)
POTASSIUM SERPL-MCNC: 4.4 MMOL/L — SIGNIFICANT CHANGE UP (ref 3.5–5.3)
POTASSIUM SERPL-SCNC: 4.4 MMOL/L — SIGNIFICANT CHANGE UP (ref 3.5–5.3)
PROTHROM AB SERPL-ACNC: 13.5 SEC — HIGH (ref 9.9–13.4)
RBC # BLD: 2.95 M/UL — LOW (ref 4.2–5.8)
RBC # FLD: 13.2 % — SIGNIFICANT CHANGE UP (ref 10.3–14.5)
SODIUM SERPL-SCNC: 140 MMOL/L — SIGNIFICANT CHANGE UP (ref 135–145)
TRIGL SERPL-MCNC: 108 MG/DL — SIGNIFICANT CHANGE UP
WBC # BLD: 8.22 K/UL — SIGNIFICANT CHANGE UP (ref 3.8–10.5)
WBC # FLD AUTO: 8.22 K/UL — SIGNIFICANT CHANGE UP (ref 3.8–10.5)

## 2025-04-26 PROCEDURE — 99232 SBSQ HOSP IP/OBS MODERATE 35: CPT

## 2025-04-26 RX ORDER — AMLODIPINE BESYLATE 10 MG/1
5 TABLET ORAL DAILY
Refills: 0 | Status: DISCONTINUED | OUTPATIENT
Start: 2025-04-26 | End: 2025-04-29

## 2025-04-26 RX ORDER — TAMSULOSIN HYDROCHLORIDE 0.4 MG/1
0.4 CAPSULE ORAL AT BEDTIME
Refills: 0 | Status: DISCONTINUED | OUTPATIENT
Start: 2025-04-26 | End: 2025-04-29

## 2025-04-26 RX ORDER — LOSARTAN POTASSIUM 100 MG/1
100 TABLET, FILM COATED ORAL DAILY
Refills: 0 | Status: DISCONTINUED | OUTPATIENT
Start: 2025-04-26 | End: 2025-04-29

## 2025-04-26 RX ORDER — ESCITALOPRAM OXALATE 20 MG/1
1 TABLET ORAL
Refills: 0 | DISCHARGE

## 2025-04-26 RX ORDER — MONTELUKAST SODIUM 10 MG/1
10 TABLET ORAL DAILY
Refills: 0 | Status: DISCONTINUED | OUTPATIENT
Start: 2025-04-26 | End: 2025-04-29

## 2025-04-26 RX ORDER — SODIUM CHLORIDE 9 G/1000ML
1000 INJECTION, SOLUTION INTRAVENOUS ONCE
Refills: 0 | Status: COMPLETED | OUTPATIENT
Start: 2025-04-26 | End: 2025-04-26

## 2025-04-26 RX ORDER — ATORVASTATIN CALCIUM 80 MG/1
10 TABLET, FILM COATED ORAL AT BEDTIME
Refills: 0 | Status: DISCONTINUED | OUTPATIENT
Start: 2025-04-26 | End: 2025-04-29

## 2025-04-26 RX ORDER — TAMSULOSIN HYDROCHLORIDE 0.4 MG/1
1 CAPSULE ORAL
Refills: 0 | DISCHARGE

## 2025-04-26 RX ORDER — LOSARTAN POTASSIUM 100 MG/1
1 TABLET, FILM COATED ORAL
Refills: 0 | DISCHARGE

## 2025-04-26 RX ORDER — OXYCODONE HYDROCHLORIDE AND ACETAMINOPHEN 10; 325 MG/1; MG/1
1 TABLET ORAL EVERY 6 HOURS
Refills: 0 | Status: DISCONTINUED | OUTPATIENT
Start: 2025-04-26 | End: 2025-04-29

## 2025-04-26 RX ORDER — ESCITALOPRAM OXALATE 20 MG/1
10 TABLET ORAL DAILY
Refills: 0 | Status: DISCONTINUED | OUTPATIENT
Start: 2025-04-26 | End: 2025-04-29

## 2025-04-26 RX ORDER — MONTELUKAST SODIUM 10 MG/1
1 TABLET ORAL
Refills: 0 | DISCHARGE

## 2025-04-26 RX ORDER — ONDANSETRON HCL/PF 4 MG/2 ML
4 VIAL (ML) INJECTION EVERY 6 HOURS
Refills: 0 | Status: DISCONTINUED | OUTPATIENT
Start: 2025-04-26 | End: 2025-04-29

## 2025-04-26 RX ADMIN — Medication 40 MILLIGRAM(S): at 02:39

## 2025-04-26 RX ADMIN — ATORVASTATIN CALCIUM 10 MILLIGRAM(S): 80 TABLET, FILM COATED ORAL at 21:42

## 2025-04-26 RX ADMIN — Medication 40 MILLIGRAM(S): at 18:17

## 2025-04-26 RX ADMIN — ESCITALOPRAM OXALATE 10 MILLIGRAM(S): 20 TABLET ORAL at 11:24

## 2025-04-26 RX ADMIN — LOSARTAN POTASSIUM 100 MILLIGRAM(S): 100 TABLET, FILM COATED ORAL at 05:21

## 2025-04-26 RX ADMIN — TAMSULOSIN HYDROCHLORIDE 0.4 MILLIGRAM(S): 0.4 CAPSULE ORAL at 21:42

## 2025-04-26 RX ADMIN — MONTELUKAST SODIUM 10 MILLIGRAM(S): 10 TABLET ORAL at 11:24

## 2025-04-26 RX ADMIN — AMLODIPINE BESYLATE 5 MILLIGRAM(S): 10 TABLET ORAL at 05:21

## 2025-04-26 RX ADMIN — SODIUM CHLORIDE 1000 MILLILITER(S): 9 INJECTION, SOLUTION INTRAVENOUS at 04:00

## 2025-04-26 NOTE — PATIENT PROFILE ADULT - FALL HARM RISK - HARM RISK INTERVENTIONS
Assistance with ambulation/Assistance OOB with selected safe patient handling equipment/Communicate Risk of Fall with Harm to all staff/Monitor gait and stability/Reinforce activity limits and safety measures with patient and family/Sit up slowly, dangle for a short time, stand at bedside before walking/Tailored Fall Risk Interventions/Visual Cue: Yellow wristband and red socks/Bed in lowest position, wheels locked, appropriate side rails in place/Call bell, personal items and telephone in reach/Instruct patient to call for assistance before getting out of bed or chair/Non-slip footwear when patient is out of bed/[place] to call system/Physically safe environment - no spills, clutter or unnecessary equipment/Purposeful Proactive Rounding/Room/bathroom lighting operational, light cord in reach

## 2025-04-26 NOTE — H&P ADULT - HISTORY OF PRESENT ILLNESS
80 M PMHx AMADO + CPAP, HTN, HLD, obesity, GERD, CKD3b. chronic back pain (Oxycodone, joint injections), afib on Eliquis was sent from home to [facility] ED from PCP for bloody stools. Two days ago patient had b/l LE swelling for the first time, went to PCP was prescribed lasix. The following day had black/bloody stools x6, called PCP who informed him to come to ED for evaluation. In ED patient has had seven black/bloody stools. Endorses headache, dizziness, lower abdominal pain. In ED vitals WNL. H/H dropping. CT abd/pelvis showed diverticulosis. Admitted to stepdown.  80 M PMHx AMADO + CPAP, HTN, HLD, obesity, GERD, CKD3b. chronic back pain (Oxycodone, joint injections), afib on Eliquis was sent from home to [facility] ED from PCP for bloody stools. Two days ago patient had b/l LE swelling for the first time, went to PCP was prescribed lasix. The following day had black/bloody stools x6, called PCP who informed him to come to ED for evaluation. In ED patient has had seven black/bloody stools with worsening SOB, headache, dizziness. Accompanied patient to bathroom for one of them and saw black stool with blood painting the bowl. Endorses headache, dizziness, lower abdominal pain. EGD 2 yrs ago with multiple gastric nodules. Colonoscopy last month with diverticulosis. In ED vitals WNL. H/H dropping. CT abd/pelvis showed diverticulosis. Admitted to stepdown.

## 2025-04-26 NOTE — H&P ADULT - NSHPPHYSICALEXAM_GEN_ALL_CORE
VITALS:   T(C): 36.5 (04-25-25 @ 23:50), Max: 36.7 (04-25-25 @ 10:56)  HR: 75 (04-25-25 @ 23:50) (75 - 99)  BP: 114/71 (04-25-25 @ 23:50) (113/68 - 132/66)  RR: 21 (04-25-25 @ 23:50) (18 - 21)  SpO2: 97% (04-25-25 @ 23:50) (93% - 98%)    GENERAL: NAD, lying in bed comfortably  HEAD:  Atraumatic, normocephalic  EYES: EOMI, PERRLA, conjunctiva clear, pale sclera  ENT: Moist mucous membranes  NECK: Supple, no JVD  HEART: Regular rate and rhythm, no murmurs, rubs, or gallops  LUNGS: Unlabored respirations.  Clear to auscultation bilaterally, no crackles, wheezing, or rhonchi  ABDOMEN: Soft, nontender, nondistended, +BS  EXTREMITIES: 2+ peripheral pulses bilaterally. No clubbing, cyanosis, or edema  NERVOUS SYSTEM:  A&Ox3, no focal deficits   SKIN: No rashes or lesions

## 2025-04-26 NOTE — PROGRESS NOTE ADULT - ASSESSMENT
Patient with hematochezia in the setting of DOAC.  Recent colonoscopy.  No evidence of any further active GI bleeding on CT.  Hold anticoagulation.  Clear liquid diet.  Monitor CBC.  Further decision to continue anticoagulation should be with discussion with cardiology team. Patient with hematochezia in the setting of DOAC.  Recent colonoscopy.  No evidence of any further active GI bleeding on CT.  Hold anticoagulation.  Clear liquid diet.  Monitor CBC.  Further decision to continue anticoagulation should be with discussion with cardiology team.Please call cardiology team.

## 2025-04-26 NOTE — H&P ADULT - NSICDXPASTMEDICALHX_GEN_ALL_CORE_FT
PAST MEDICAL HISTORY:  GERD (gastroesophageal reflux disease)     Hyperlipidemia     Hypertension     Shoulder pain, left

## 2025-04-26 NOTE — H&P ADULT - NSICDXPASTSURGICALHX_GEN_ALL_CORE_FT
PAST SURGICAL HISTORY:  S/P appendectomy 1953    S/P colonoscopy negative    S/P T&A (status post tonsillectomy and adenoidectomy)     S/P TURP 2008

## 2025-04-26 NOTE — PATIENT PROFILE ADULT - FALL HARM RISK - DEVICES
Subjective   Patient ID: Kian is a 56 year old male calls in today for a phone visit due to the Covid-19 pandemic. No fever, cough or URI symptoms, and no chest pain or SOB. Continues to exercise by walking daily, and weight is unchanged. Taking all medications as ordered, and states BP at home is well controlled.    Chief Complaint   Patient presents with   • Follow-up     6 month f/u     HPI    Kian has Chronic rhinitis; Essential (primary) hypertension; Hyperlipidemia, unspecified; Obesity (BMI 30-39.9); and Prostate cancer screening on their problem list.  Kian has a past surgical history that includes Parathyroidectomy and Vasectomy.  Kian reports that he has never smoked. He has never used smokeless tobacco. He reports previous alcohol use. He reports that he does not use drugs.  Kian has a current medication list which includes the following prescription(s): losartan-hydrochlorothiazide, amlodipine, and fluticasone.  Kian has No Known Allergies.    Review of Systems   All other systems reviewed and are negative.      Objective    Visit Vitals  /89 (BP Location: RUE - Right upper extremity, Patient Position: Sitting) Comment: pt reported   Pulse 75 Comment: pt reported       Physical Exam   Unable to perform since this is a phone visit    Assessment   Problem List Items Addressed This Visit        Respiratory    Chronic rhinitis - controlled with current medication       Circulatory    Essential (primary) hypertension - controlled with current medication  Low sodium diet  Monitor BP at home daily       Digestive    Obesity (BMI 30-39.9)  Diet and exercise encouraged for weight loss       Endocrine    Hyperlipidemia, unspecified - diet controlled only  Follow up here in 1-2 months for fasting labs      Total time - 8 minutes        
None

## 2025-04-26 NOTE — H&P ADULT - NSICDXFAMILYHX_GEN_ALL_CORE_FT
FAMILY HISTORY:  Father  Still living? No  Family history of COPD (chronic obstructive pulmonary disease), Age at diagnosis: Age Unknown  Family history of heart disease, Age at diagnosis: Age Unknown  Family history of hypertension, Age at diagnosis: Age Unknown  Family history of pacemaker, Age at diagnosis: Age Unknown    Mother  Still living? No  Family history of stroke, Age at diagnosis: Age Unknown  Family history of type 2 diabetes mellitus, Age at diagnosis: Age Unknown    Sibling  Still living? Yes, Estimated age: 61-70  Family history of type 2 diabetes mellitus, Age at diagnosis: Age Unknown

## 2025-04-26 NOTE — H&P ADULT - ATTENDING COMMENTS
80 M Hx of AMADO on CPAP, HTN, HLD, obesity, GERD, CKD3b. chronic back pain (Oxycodone, joint injections), afib on Eliquis was sent from home to [facility] ED from PCP for bloody stools. Noted to have a 1 g drop in HGB, while in the ED, pt with active episodes of blood bowel movement and became symptomatic. Imaging showing pan diverticulosis. Transfuse 1 unit PRBC for symptomatic anemia. GI consulted, NPO for possible scope. Hold Eliquis for now.

## 2025-04-26 NOTE — H&P ADULT - ASSESSMENT
80 M PMHx AMADO + CPAP, HTN, HLD, obesity, GERD, CKD3b. chronic back pain (Oxycodone, joint injections), afib on Eliquis was sent from home to [facility] ED from PCP for bloody stools. Admitted for GIB.     Plan:  GIB  #UGIB vs. LGIB   - dizziness, bloody BMs  - H/H dropping  - Hx of chronic constipation, diverticulosis, GERD   - Protonix 40 mg IV BID  - If cirrhotic initiate octreotide 50 mcg bolus then 50 mcg/hr gtt + ceftriaxone 1g q24  - NPO   - Monitor H/H closely  - Maintain active type and screen, two large bore IVs  - Transfuse to maintain Hgb >8    HTN/afib   - Held eliquis due to active GI bleeding   - CHADVASC 3   - c/w losartan, amlodipine     LE edema  - Likely due to acute anemia   - Held Lasix due to active exsanguination  - Strict I/Os      Chronic pain/depression  - c/w escitalopram   - c/w oxycodone PRN     HLD  - c/w atorvastatin     BPH  - c/w tamsulosin     DVT: encourage mobility  80 M PMHx AMADO + CPAP, HTN, HLD, obesity, GERD, CKD3b. chronic back pain (Oxycodone, joint injections), afib on Eliquis was sent from home to [facility] ED from PCP for bloody stools. Admitted for GIB.     Plan:  GIB  #UGIB vs. LGIB   - dizziness, bloody BMs  - H/H dropping  - Hx of chronic constipation, diverticulosis, GERD   - CT  abd/pelvis showed CT evidence of active GI bleed and pancolonic diverticulosis without diverticulitis.  - Protonix 40 mg IV BID  - NPO   - GI following   - Monitor H/H closely  - Maintain active type and screen, two large bore IVs  - Transfuse to maintain Hgb >8    HTN/afib   - Held eliquis due to active GI bleeding   - CHADVASC 3   - c/w losartan, amlodipine     LE edema  - Likely due to acute anemia   - Held Lasix due to active exsanguination  - Strict I/Os      Chronic pain/depression  - c/w escitalopram   - c/w oxycodone PRN     HLD  - c/w atorvastatin     BPH  - Enlarged prostate seen on CT   - c/w tamsulosin     DVT: encourage mobility   Diet: NPO  Dispo: Pending GI intervention, 2-3 days  80 M PMHx AMADO + CPAP, HTN, HLD, obesity, GERD, CKD3b. chronic back pain (Oxycodone, joint injections), afib on Eliquis was sent from home to [facility] ED from PCP for bloody stools. Admitted for GIB.     Plan:  GIB  #UGIB vs. LGIB   - dizziness, melena, hematochezia   - H/H dropping  - Hx of chronic constipation, diverticulosis, GERD   - CT  abd/pelvis showed CT evidence of active GI bleed and pancolonic diverticulosis without diverticulitis.  - Protonix 40 mg IV BID  - NPO   - GI following   - Monitor H/H closely  - Maintain active type and screen, two large bore IVs  - Transfuse to maintain Hgb >8    HTN/afib   - Held eliquis due to active GI bleeding   - CHADVASC 3   - c/w losartan, amlodipine     LE edema  - Likely due to acute anemia   - Held Lasix due to active exsanguination  - Strict I/Os      Chronic pain   #Lumbar disc degeneration  #osteoarthritis  - Follows with orthopedic surgery and PMNR   - C/w oxycodone PRN    Depression  - c/w escitalopram     HLD  - c/w atorvastatin     BPH  - Enlarged prostate seen on CT   - c/w tamsulosin     DVT: encourage mobility   Diet: NPO  Dispo: Pending GI intervention, 2-3 days  80 M PMHx AMADO + CPAP, HTN, HLD, obesity, GERD, CKD3b. chronic back pain (Oxycodone, joint injections), afib on Eliquis was sent from home to [facility] ED from PCP for bloody stools. Admitted for GIB.     Plan:  GIB  #UGIB vs. LGIB   - dizziness, melena, hematochezia   - H/H dropping  - Hx of chronic constipation, diverticulosis, GERD   - CT  abd/pelvis showed CT evidence of active GI bleed and pancolonic diverticulosis without diverticulitis.  - Protonix 40 mg IV BID  - 1 U PRBCs ordered   - NPO   - GI following   - Monitor H/H closely  - Maintain active type and screen, two large bore IVs  - Transfuse to maintain Hgb >8    HTN/afib   - Held eliquis due to active GI bleeding   - CHADVASC 3   - c/w losartan, amlodipine     LE edema  - Likely due to acute anemia   - Held Lasix due to active exsanguination  - Strict I/Os      Chronic pain   #Lumbar disc degeneration  #osteoarthritis  - Follows with orthopedic surgery and PMNR   - C/w oxycodone PRN    Depression  - c/w escitalopram     HLD  - c/w atorvastatin     BPH  - Enlarged prostate seen on CT   - c/w tamsulosin     DVT: encourage mobility   Diet: NPO  Dispo: Pending GI intervention, 2-3 days  80 M PMHx AMADO + CPAP, HTN, HLD, obesity, GERD, CKD3b. chronic back pain (Oxycodone, joint injections), afib on Eliquis was sent from home to [facility] ED from PCP for bloody stools. Admitted for GIB.     Plan:  GIB  #UGIB vs. LGIB   #Symptomatic anemia  - dizziness, melena, hematochezia   - H/H dropping  - Hx of chronic constipation, diverticulosis, GERD   - CT  abd/pelvis showed CT evidence of active GI bleed and pancolonic diverticulosis without diverticulitis.  - Protonix 40 mg IV BID  - 1 U PRBCs ordered   - NPO   - GI following   - Monitor H/H closely  - Maintain active type and screen, two large bore IVs  - Transfuse to maintain Hgb >8    HTN/afib   - Held eliquis due to active GI bleeding   - CHADVASC 3   - c/w losartan, amlodipine     LE edema  - Held Lasix due to active exsanguination  - Strict I/Os      Chronic pain   #Lumbar disc degeneration  #osteoarthritis  - Follows with orthopedic surgery and PMNR   - C/w oxycodone PRN    Depression  - c/w escitalopram     HLD  - c/w atorvastatin     BPH  - Enlarged prostate seen on CT   - c/w tamsulosin     DVT: encourage mobility   Diet: NPO  Dispo: Pending GI intervention, 2-3 days

## 2025-04-26 NOTE — PROGRESS NOTE ADULT - SUBJECTIVE AND OBJECTIVE BOX
INTERVAL HPI/OVERNIGHT EVENTS:Patient follow-up has been performed for rectal bleeding.  He has undergone CT abdomen which has not revealed any active bleeding.  He had a colonoscopy about a month ago.    MEDICATIONS  (STANDING):  amLODIPine   Tablet 5 milliGRAM(s) Oral daily  atorvastatin 10 milliGRAM(s) Oral at bedtime  escitalopram 10 milliGRAM(s) Oral daily  losartan 100 milliGRAM(s) Oral daily  montelukast 10 milliGRAM(s) Oral daily  pantoprazole  Injectable 40 milliGRAM(s) IV Push two times a day  tamsulosin 0.4 milliGRAM(s) Oral at bedtime    MEDICATIONS  (PRN):  ondansetron Injectable 4 milliGRAM(s) IV Push every 6 hours PRN Nausea  oxycodone    5 mG/acetaminophen 325 mG 1 Tablet(s) Oral every 6 hours PRN Moderate Pain (4 - 6)      Allergies    No Known Allergies    Intolerances        Vital Signs Last 24 Hrs  T(C): 36.9 (26 Apr 2025 06:01), Max: 37.2 (26 Apr 2025 04:00)  T(F): 98.5 (26 Apr 2025 06:01), Max: 99 (26 Apr 2025 04:00)  HR: 74 (26 Apr 2025 06:01) (74 - 99)  BP: 130/75 (26 Apr 2025 06:01) (113/68 - 132/66)  BP(mean): --  RR: 18 (26 Apr 2025 06:01) (18 - 21)  SpO2: 97% (26 Apr 2025 06:01) (93% - 100%)    Parameters below as of 26 Apr 2025 06:01  Patient On (Oxygen Delivery Method): room air        LABS:                        8.9    8.22  )-----------( 244      ( 26 Apr 2025 02:00 )             27.1     04-26    140  |  106  |  22.0[H]  ----------------------------<  136[H]  4.4   |  21.0[L]  |  1.22    Ca    7.9[L]      26 Apr 2025 02:00  Phos  3.2     04-26  Mg     1.9     04-26    TPro  5.7[L]  /  Alb  3.5  /  TBili  0.5  /  DBili  x   /  AST  23  /  ALT  21  /  AlkPhos  51  04-25    PT/INR - ( 26 Apr 2025 02:00 )   PT: 13.5 sec;   INR: 1.20 ratio         PTT - ( 26 Apr 2025 02:00 )  PTT:33.7 sec  Urinalysis Basic - ( 26 Apr 2025 02:00 )    Color: x / Appearance: x / SG: x / pH: x  Gluc: 136 mg/dL / Ketone: x  / Bili: x / Urobili: x   Blood: x / Protein: x / Nitrite: x   Leuk Esterase: x / RBC: x / WBC x   Sq Epi: x / Non Sq Epi: x / Bacteria: x        RADIOLOGY & ADDITIONAL TESTS:    < from: CT Abdomen and Pelvis w/wo IV Cont (04.25.25 @ 20:21) >    ACC: [id number] EXAM:  CT ABDOMEN AND PELVIS WAW IC   ORDERED BY: TONIA GRULLON     PROCEDURE DATE:  04/25/2025          INTERPRETATION:  CLINICAL INFORMATION: 80 years  Male with rectal   bleeding.    ADDITIONAL CLINICAL INFORMATION: Other, Non-specified    COMPARISON: None.    CONTRAST/COMPLICATIONS:  IV Contrast: Omnipaque 350  100 cc administered   0 cc discarded  Oral Contrast: NONE  .    PROCEDURE:  CT of the Abdomen and Pelvis was performed.  Precontrast, Arterial and Delayed phases wereperformed.  Sagittal and coronal reformats were performed.    FINDINGS:  LOWER CHEST: Aortic and coronary atherosclerosis.    LIVER: Within normal limits.  BILE DUCTS: Normal caliber.  GALLBLADDER: Within normal limits.  SPLEEN: Within normal limits.  PANCREAS: Within normal limits.  ADRENALS: Within normal limits.  KIDNEYS/URETERS: Punctate nonobstructing calculus in each kidney versus   vascular calcification. Bilateral cysts measuring up to 1.6 cm. No   hydronephrosis. Symmetrical nephrograms.Nonspecific bilateral   perinephric fat stranding.    BLADDER: Within normal limits.  REPRODUCTIVE ORGANS: Markedly enlarged prostate measuring 6.7 x 5.2 x 6.3   cm. 2.1 cm hypodense nodule at the base of the prostate (11:138). With   projection intothe bladder base.    BOWEL: No bowel obstruction. Appendix  . Pancolonic diverticulosis   without diverticulitis. No intraluminal contrast extravasation.  PERITONEUM/RETROPERITONEUM: Within normal limits.  VESSELS: Within normal limits.  LYMPH NODES:No lymphadenopathy.  ABDOMINAL WALL: Small fat-containing left inguinal hernia.  BONES: Thoracic degenerative changes.    IMPRESSION:  No CT evidence of active GI bleed.    Pancolonic diverticulosis without diverticulitis.    2.1 cm hypodense nodule at the prostate base. Enlarged prostate projects   into the bladder. Recommend urology evaluation and possible prostate MRI.        --- End of Report ---            JULIEN ERNANDEZ MD; Attending Radiologist  This document has been electronically signed. Apr 25 2025  9:08PM    < end of copied text >

## 2025-04-27 LAB
ANION GAP SERPL CALC-SCNC: 11 MMOL/L — SIGNIFICANT CHANGE UP (ref 5–17)
BUN SERPL-MCNC: 16.5 MG/DL — SIGNIFICANT CHANGE UP (ref 8–20)
CALCIUM SERPL-MCNC: 7.5 MG/DL — LOW (ref 8.4–10.5)
CHLORIDE SERPL-SCNC: 109 MMOL/L — HIGH (ref 96–108)
CO2 SERPL-SCNC: 23 MMOL/L — SIGNIFICANT CHANGE UP (ref 22–29)
CREAT SERPL-MCNC: 1.1 MG/DL — SIGNIFICANT CHANGE UP (ref 0.5–1.3)
EGFR: 69 ML/MIN/1.73M2 — SIGNIFICANT CHANGE UP
EGFR: 69 ML/MIN/1.73M2 — SIGNIFICANT CHANGE UP
GLUCOSE SERPL-MCNC: 109 MG/DL — HIGH (ref 70–99)
HCT VFR BLD CALC: 22.4 % — LOW (ref 39–50)
HCT VFR BLD CALC: 23.7 % — LOW (ref 39–50)
HGB BLD-MCNC: 7.3 G/DL — LOW (ref 13–17)
HGB BLD-MCNC: 7.9 G/DL — LOW (ref 13–17)
MAGNESIUM SERPL-MCNC: 2.1 MG/DL — SIGNIFICANT CHANGE UP (ref 1.6–2.6)
MCHC RBC-ENTMCNC: 29.8 PG — SIGNIFICANT CHANGE UP (ref 27–34)
MCHC RBC-ENTMCNC: 32.6 G/DL — SIGNIFICANT CHANGE UP (ref 32–36)
MCV RBC AUTO: 91.4 FL — SIGNIFICANT CHANGE UP (ref 80–100)
NRBC # BLD AUTO: 0 K/UL — SIGNIFICANT CHANGE UP (ref 0–0)
NRBC # FLD: 0 K/UL — SIGNIFICANT CHANGE UP (ref 0–0)
NRBC BLD AUTO-RTO: 0 /100 WBCS — SIGNIFICANT CHANGE UP (ref 0–0)
PLATELET # BLD AUTO: 195 K/UL — SIGNIFICANT CHANGE UP (ref 150–400)
PMV BLD: 10.7 FL — SIGNIFICANT CHANGE UP (ref 7–13)
POTASSIUM SERPL-MCNC: 4.1 MMOL/L — SIGNIFICANT CHANGE UP (ref 3.5–5.3)
POTASSIUM SERPL-SCNC: 4.1 MMOL/L — SIGNIFICANT CHANGE UP (ref 3.5–5.3)
RBC # BLD: 2.45 M/UL — LOW (ref 4.2–5.8)
RBC # FLD: 14.4 % — SIGNIFICANT CHANGE UP (ref 10.3–14.5)
SODIUM SERPL-SCNC: 143 MMOL/L — SIGNIFICANT CHANGE UP (ref 135–145)
WBC # BLD: 5.6 K/UL — SIGNIFICANT CHANGE UP (ref 3.8–10.5)
WBC # FLD AUTO: 5.6 K/UL — SIGNIFICANT CHANGE UP (ref 3.8–10.5)

## 2025-04-27 PROCEDURE — 99233 SBSQ HOSP IP/OBS HIGH 50: CPT

## 2025-04-27 RX ORDER — CALCIUM GLUCONATE 20 MG/ML
1 INJECTION, SOLUTION INTRAVENOUS ONCE
Refills: 0 | Status: COMPLETED | OUTPATIENT
Start: 2025-04-27 | End: 2025-04-27

## 2025-04-27 RX ADMIN — LOSARTAN POTASSIUM 100 MILLIGRAM(S): 100 TABLET, FILM COATED ORAL at 05:43

## 2025-04-27 RX ADMIN — AMLODIPINE BESYLATE 5 MILLIGRAM(S): 10 TABLET ORAL at 05:43

## 2025-04-27 RX ADMIN — MONTELUKAST SODIUM 10 MILLIGRAM(S): 10 TABLET ORAL at 10:30

## 2025-04-27 RX ADMIN — Medication 40 MILLIGRAM(S): at 17:20

## 2025-04-27 RX ADMIN — ATORVASTATIN CALCIUM 10 MILLIGRAM(S): 80 TABLET, FILM COATED ORAL at 21:42

## 2025-04-27 RX ADMIN — CALCIUM GLUCONATE 100 GRAM(S): 20 INJECTION, SOLUTION INTRAVENOUS at 16:21

## 2025-04-27 RX ADMIN — Medication 40 MILLIGRAM(S): at 05:42

## 2025-04-27 RX ADMIN — ESCITALOPRAM OXALATE 10 MILLIGRAM(S): 20 TABLET ORAL at 10:30

## 2025-04-27 RX ADMIN — TAMSULOSIN HYDROCHLORIDE 0.4 MILLIGRAM(S): 0.4 CAPSULE ORAL at 21:42

## 2025-04-27 NOTE — PROGRESS NOTE ADULT - SUBJECTIVE AND OBJECTIVE BOX
INTERVAL HPI/OVERNIGHT EVENTS:Patient follow-up is being performed for lower GI bleeding.  He has significant drop in his blood count requiring packed RBC transfusion.  CT has not shown any evidence of active bleeding but he has diverticulosis.  Last colonoscopy was about a month ago    MEDICATIONS  (STANDING):  amLODIPine   Tablet 5 milliGRAM(s) Oral daily  atorvastatin 10 milliGRAM(s) Oral at bedtime  escitalopram 10 milliGRAM(s) Oral daily  losartan 100 milliGRAM(s) Oral daily  montelukast 10 milliGRAM(s) Oral daily  pantoprazole  Injectable 40 milliGRAM(s) IV Push two times a day  tamsulosin 0.4 milliGRAM(s) Oral at bedtime    MEDICATIONS  (PRN):  ondansetron Injectable 4 milliGRAM(s) IV Push every 6 hours PRN Nausea  oxycodone    5 mG/acetaminophen 325 mG 1 Tablet(s) Oral every 6 hours PRN Moderate Pain (4 - 6)      Allergies    No Known Allergies    Intolerances        Vital Signs Last 24 Hrs  T(C): 36.7 (27 Apr 2025 04:22), Max: 37.6 (26 Apr 2025 12:14)  T(F): 98 (27 Apr 2025 04:22), Max: 99.7 (26 Apr 2025 12:14)  HR: 100 (27 Apr 2025 05:43) (63 - 100)  BP: 124/63 (27 Apr 2025 05:43) (109/72 - 150/77)  BP(mean): 100 (26 Apr 2025 16:04) (76 - 100)  RR: 18 (27 Apr 2025 05:43) (17 - 18)  SpO2: 98% (27 Apr 2025 05:43) (93% - 100%)    Parameters below as of 27 Apr 2025 05:43  Patient On (Oxygen Delivery Method): room air        LABS:                        7.3    5.60  )-----------( 195      ( 27 Apr 2025 04:31 )             22.4     04-27    143  |  109[H]  |  16.5  ----------------------------<  109[H]  4.1   |  23.0  |  1.10    Ca    7.5[L]      27 Apr 2025 04:31  Phos  3.2     04-26  Mg     2.1     04-27    TPro  5.7[L]  /  Alb  3.5  /  TBili  0.5  /  DBili  x   /  AST  23  /  ALT  21  /  AlkPhos  51  04-25    PT/INR - ( 26 Apr 2025 02:00 )   PT: 13.5 sec;   INR: 1.20 ratio         PTT - ( 26 Apr 2025 02:00 )  PTT:33.7 sec  Urinalysis Basic - ( 27 Apr 2025 04:31 )    Color: x / Appearance: x / SG: x / pH: x  Gluc: 109 mg/dL / Ketone: x  / Bili: x / Urobili: x   Blood: x / Protein: x / Nitrite: x   Leuk Esterase: x / RBC: x / WBC x   Sq Epi: x / Non Sq Epi: x / Bacteria: x        RADIOLOGY & ADDITIONAL TESTS:  < from: CT Abdomen and Pelvis w/wo IV Cont (04.25.25 @ 20:21) >    ACC: [id number] EXAM:  CT ABDOMEN AND PELVIS WAW IC   ORDERED BY: TONIA GRULLON     PROCEDURE DATE:  04/25/2025          INTERPRETATION:  CLINICAL INFORMATION: 80 years  Male with rectal   bleeding.    ADDITIONAL CLINICAL INFORMATION: Other, Non-specified    COMPARISON: None.    CONTRAST/COMPLICATIONS:  IV Contrast: Omnipaque 350  100 cc administered   0 cc discarded  Oral Contrast: NONE  .    PROCEDURE:  CT of the Abdomen and Pelvis was performed.  Precontrast, Arterial and Delayed phases wereperformed.  Sagittal and coronal reformats were performed.    FINDINGS:  LOWER CHEST: Aortic and coronary atherosclerosis.    LIVER: Within normal limits.  BILE DUCTS: Normal caliber.  GALLBLADDER: Within normal limits.  SPLEEN: Within normal limits.  PANCREAS: Within normal limits.  ADRENALS: Within normal limits.  KIDNEYS/URETERS: Punctate nonobstructing calculus in each kidney versus   vascular calcification. Bilateral cysts measuring up to 1.6 cm. No   hydronephrosis. Symmetrical nephrograms.Nonspecific bilateral   perinephric fat stranding.    BLADDER: Within normal limits.  REPRODUCTIVE ORGANS: Markedly enlarged prostate measuring 6.7 x 5.2 x 6.3   cm. 2.1 cm hypodense nodule at the base of the prostate (11:138). With   projection intothe bladder base.    BOWEL: No bowel obstruction. Appendix  . Pancolonic diverticulosis   without diverticulitis. No intraluminal contrast extravasation.  PERITONEUM/RETROPERITONEUM: Within normal limits.  VESSELS: Within normal limits.  LYMPH NODES:No lymphadenopathy.  ABDOMINAL WALL: Small fat-containing left inguinal hernia.  BONES: Thoracic degenerative changes.    IMPRESSION:  No CT evidence of active GI bleed.    Pancolonic diverticulosis without diverticulitis.    2.1 cm hypodense nodule at the prostate base. Enlarged prostate projects   into the bladder. Recommend urology evaluation and possible prostate MRI.        --- End of Report ---            JULIEN ERNANDEZ MD; Attending Radiologist  This document has been electronically signed. Apr 25 2025  9:08PM    < end of copied text >   INTERVAL HPI/OVERNIGHT EVENTS:Patient follow-up is being performed for lower GI bleeding.  He has significant drop in his blood count requiring packed RBC transfusion.  CT has not shown any evidence of active bleeding but he has diverticulosis.  Last colonoscopy was about a month ago.As per the patient his bowel movements are getting better and smaller in volume.  He had 1 transfusion yesterday.He feels weak.  On clear liquid diet    MEDICATIONS  (STANDING):  amLODIPine   Tablet 5 milliGRAM(s) Oral daily  atorvastatin 10 milliGRAM(s) Oral at bedtime  escitalopram 10 milliGRAM(s) Oral daily  losartan 100 milliGRAM(s) Oral daily  montelukast 10 milliGRAM(s) Oral daily  pantoprazole  Injectable 40 milliGRAM(s) IV Push two times a day  tamsulosin 0.4 milliGRAM(s) Oral at bedtime    MEDICATIONS  (PRN):  ondansetron Injectable 4 milliGRAM(s) IV Push every 6 hours PRN Nausea  oxycodone    5 mG/acetaminophen 325 mG 1 Tablet(s) Oral every 6 hours PRN Moderate Pain (4 - 6)      Allergies    No Known Allergies    Intolerances        Vital Signs Last 24 Hrs  T(C): 36.7 (27 Apr 2025 04:22), Max: 37.6 (26 Apr 2025 12:14)  T(F): 98 (27 Apr 2025 04:22), Max: 99.7 (26 Apr 2025 12:14)  HR: 100 (27 Apr 2025 05:43) (63 - 100)  BP: 124/63 (27 Apr 2025 05:43) (109/72 - 150/77)  BP(mean): 100 (26 Apr 2025 16:04) (76 - 100)  RR: 18 (27 Apr 2025 05:43) (17 - 18)  SpO2: 98% (27 Apr 2025 05:43) (93% - 100%)    Parameters below as of 27 Apr 2025 05:43  Patient On (Oxygen Delivery Method): room air        LABS:                        7.3    5.60  )-----------( 195      ( 27 Apr 2025 04:31 )             22.4     04-27    143  |  109[H]  |  16.5  ----------------------------<  109[H]  4.1   |  23.0  |  1.10    Ca    7.5[L]      27 Apr 2025 04:31  Phos  3.2     04-26  Mg     2.1     04-27    TPro  5.7[L]  /  Alb  3.5  /  TBili  0.5  /  DBili  x   /  AST  23  /  ALT  21  /  AlkPhos  51  04-25    PT/INR - ( 26 Apr 2025 02:00 )   PT: 13.5 sec;   INR: 1.20 ratio         PTT - ( 26 Apr 2025 02:00 )  PTT:33.7 sec  Urinalysis Basic - ( 27 Apr 2025 04:31 )    Color: x / Appearance: x / SG: x / pH: x  Gluc: 109 mg/dL / Ketone: x  / Bili: x / Urobili: x   Blood: x / Protein: x / Nitrite: x   Leuk Esterase: x / RBC: x / WBC x   Sq Epi: x / Non Sq Epi: x / Bacteria: x        RADIOLOGY & ADDITIONAL TESTS:  < from: CT Abdomen and Pelvis w/wo IV Cont (04.25.25 @ 20:21) >    ACC: [id number] EXAM:  CT ABDOMEN AND PELVIS WAW IC   ORDERED BY: TONIA GRULLON     PROCEDURE DATE:  04/25/2025          INTERPRETATION:  CLINICAL INFORMATION: 80 years  Male with rectal   bleeding.    ADDITIONAL CLINICAL INFORMATION: Other, Non-specified    COMPARISON: None.    CONTRAST/COMPLICATIONS:  IV Contrast: Omnipaque 350  100 cc administered   0 cc discarded  Oral Contrast: NONE  .    PROCEDURE:  CT of the Abdomen and Pelvis was performed.  Precontrast, Arterial and Delayed phases wereperformed.  Sagittal and coronal reformats were performed.    FINDINGS:  LOWER CHEST: Aortic and coronary atherosclerosis.    LIVER: Within normal limits.  BILE DUCTS: Normal caliber.  GALLBLADDER: Within normal limits.  SPLEEN: Within normal limits.  PANCREAS: Within normal limits.  ADRENALS: Within normal limits.  KIDNEYS/URETERS: Punctate nonobstructing calculus in each kidney versus   vascular calcification. Bilateral cysts measuring up to 1.6 cm. No   hydronephrosis. Symmetrical nephrograms.Nonspecific bilateral   perinephric fat stranding.    BLADDER: Within normal limits.  REPRODUCTIVE ORGANS: Markedly enlarged prostate measuring 6.7 x 5.2 x 6.3   cm. 2.1 cm hypodense nodule at the base of the prostate (11:138). With   projection intothe bladder base.    BOWEL: No bowel obstruction. Appendix  . Pancolonic diverticulosis   without diverticulitis. No intraluminal contrast extravasation.  PERITONEUM/RETROPERITONEUM: Within normal limits.  VESSELS: Within normal limits.  LYMPH NODES:No lymphadenopathy.  ABDOMINAL WALL: Small fat-containing left inguinal hernia.  BONES: Thoracic degenerative changes.    IMPRESSION:  No CT evidence of active GI bleed.    Pancolonic diverticulosis without diverticulitis.    2.1 cm hypodense nodule at the prostate base. Enlarged prostate projects   into the bladder. Recommend urology evaluation and possible prostate MRI.        --- End of Report ---            JULIEN ERNANDEZ MD; Attending Radiologist  This document has been electronically signed. Apr 25 2025  9:08PM    < end of copied text >

## 2025-04-27 NOTE — PROGRESS NOTE ADULT - ASSESSMENT
Patient with history of A-fib, on DOAC and colonic diverticulosis, CKD presented with rectal bleeding and there is acute blood loss anemia related to GI bleeding.  Acute blood loss anemia: Transfused packed RBC to keep hemoglobin hematocrit around 8/ 24.  Continue clear liquid diet  GI bleeding: Continue clear liquid diet.  Low threshold to repeat colonoscopy.  DOAC.:  May need to review with cardiology.  Please obtain cardiology assessment for long-term need for DOAC Patient with history of A-fib, on DOAC and colonic diverticulosis, CKD presented with rectal bleeding and there is acute blood loss anemia related to GI bleeding.  Acute blood loss anemia: Transfused packed RBC to keep hemoglobin hematocrit around 8/ 24.  Continue clear liquid diet  GI bleeding: Continue clear liquid diet.  Low threshold to repeat colonoscopy.  DOAC.:  May need to review with cardiology.  Please obtain cardiology assessment for long-term need for DOAC  Discussed at length with the patient and attending hospitalist Dr. Travis

## 2025-04-27 NOTE — PROGRESS NOTE ADULT - ASSESSMENT
80 M with AMADO + CPAP, HTN, HLD, obesity, GERD, CKD3b. chronic back pain (Oxycodone, joint injections), Afib on Eliquis admitted with GIB     Acute blood loss anemia   Likely secondary to diverticulosis   Recently had a colonoscopy  s/p 1 unit PRBC  Additional unit ordered for today  Orthostatics pos  Tolertaing CLD   GI on board   Transfuse to maintain Hgb >8  Repeat H/H 21:00    HTN/afib   DOAC on hold due to bleed  CHADVASC 3   c/w losartan, amlodipine     LE edema  Held Lasix due to active exsanguination  Strict I/Os      Chronic pain   Lumbar disc degeneration  osteoarthritis  Follows with orthopedic surgery and PMNR   C/w oxycodone PRN    Depression  c/w escitalopram     HLD  c/w atorvastatin     BPH  Enlarged prostate seen on CT   c/w tamsulosin     DVT: encourage mobility   Diet: CLD  Dispo: Penidng stable H/H and AC challenge  80 M with AMADO + CPAP, HTN, HLD, obesity, GERD, CKD3b. chronic back pain (Oxycodone, joint injections), Afib on Eliquis admitted with GIB     Acute blood loss anemia   Likely secondary to diverticulosis   Recently had a colonoscopy  s/p 1 unit PRBC  Additional unit ordered for today  Orthostatics pos  Tolertaing CLD   GI on board   Transfuse to maintain Hgb >8  Repeat H/H 21:00    Hypocalcemia   Now planned for 2nd unit today  Give Calciu gluconate to avoid citrate binding effect    HTN/afib   DOAC on hold due to bleed  CHADVASC 3   c/w losartan, amlodipine     LE edema  Held Lasix due to active exsanguination  Strict I/Os      Chronic pain   Lumbar disc degeneration  osteoarthritis  Follows with orthopedic surgery and PMNR   C/w oxycodone PRN    Depression  c/w escitalopram     HLD  c/w atorvastatin     BPH  Enlarged prostate seen on CT   c/w tamsulosin     DVT: encourage mobility   Diet: CLD  Dispo: Penidng stable H/H and AC challenge

## 2025-04-27 NOTE — PROGRESS NOTE ADULT - SUBJECTIVE AND OBJECTIVE BOX
[facility] Division of Hospital Medicine    Chief Complaint:  GIB     SUBJECTIVE / OVERNIGHT EVENTS:  Pt examined laying in bed  States he is feeling better but still lightheaded when standing  Orthostatics pos  Patient denies chest pain, SOB, abd pain, N/V, fever, chills, dysuria or any other complaints. All remainder ROS negative.     MEDICATIONS  (STANDING):  amLODIPine   Tablet 5 milliGRAM(s) Oral daily  atorvastatin 10 milliGRAM(s) Oral at bedtime  escitalopram 10 milliGRAM(s) Oral daily  losartan 100 milliGRAM(s) Oral daily  montelukast 10 milliGRAM(s) Oral daily  pantoprazole  Injectable 40 milliGRAM(s) IV Push two times a day  tamsulosin 0.4 milliGRAM(s) Oral at bedtime    MEDICATIONS  (PRN):  ondansetron Injectable 4 milliGRAM(s) IV Push every 6 hours PRN Nausea  oxycodone    5 mG/acetaminophen 325 mG 1 Tablet(s) Oral every 6 hours PRN Moderate Pain (4 - 6)        PHYSICAL EXAM:  Vital Signs Last 24 Hrs  T(C): 36.6 (27 Apr 2025 15:27), Max: 37.4 (27 Apr 2025 10:14)  T(F): 97.9 (27 Apr 2025 15:27), Max: 99.4 (27 Apr 2025 13:02)  HR: 80 (27 Apr 2025 15:27) (63 - 100)  BP: 115/66 (27 Apr 2025 15:27) (109/72 - 150/77)  BP(mean): 75 (27 Apr 2025 11:24) (75 - 100)  RR: 17 (27 Apr 2025 15:27) (17 - 18)  SpO2: 97% (27 Apr 2025 15:27) (96% - 100%)    Parameters below as of 27 Apr 2025 15:27  Patient On (Oxygen Delivery Method): room air        CONSTITUTIONAL: Non toxic appearing, lying in bed  ENMT: Moist oral mucosa, no pharyngeal injection or exudates; normal dentition  RESPIRATORY: Normal respiratory effort; lungs are clear to auscultation bilaterally  CARDIOVASCULAR: Regular rate and rhythm, normal S1 and S2, no murmur/rub/gallop; No lower extremity edema; Peripheral pulses are 2+ bilaterally  ABDOMEN: Nontender to palpation, normoactive bowel sounds, no rebound/guarding; No hepatosplenomegaly  MUSCLOSKELETAL:   no clubbing or cyanosis of digits; no joint swelling or tenderness to palpation  PSYCH: A+O to person, place, and time; affect appropriate  NEUROLOGY: CN 2-12 are intact and symmetric; no gross sensory deficits;   SKIN: No rashes; no palpable lesions    LABS:                                   7.3    5.60  )-----------( 195      ( 27 Apr 2025 04:31 )             22.4   04-27    143  |  109[H]  |  16.5  ----------------------------<  109[H]  4.1   |  23.0  |  1.10    Ca    7.5[L]      27 Apr 2025 04:31  Phos  3.2     04-26  Mg     2.1     04-27          PT/INR - ( 26 Apr 2025 02:00 )   PT: 13.5 sec;   INR: 1.20 ratio         PTT - ( 26 Apr 2025 02:00 )  PTT:33.7 sec      Urinalysis Basic - ( 27 Apr 2025 04:31 )    Color: x / Appearance: x / SG: x / pH: x  Gluc: 109 mg/dL / Ketone: x  / Bili: x / Urobili: x   Blood: x / Protein: x / Nitrite: x   Leuk Esterase: x / RBC: x / WBC x   Sq Epi: x / Non Sq Epi: x / Bacteria: x        Urinalysis with Rflx Culture (collected 25 Apr 2025 13:26)      CAPILLARY BLOOD GLUCOSE            MICRO:        RADIOLOGY & ADDITIONAL TESTS:

## 2025-04-28 ENCOUNTER — APPOINTMENT (OUTPATIENT)
Dept: ELECTROPHYSIOLOGY | Facility: CLINIC | Age: 80
End: 2025-04-28

## 2025-04-28 DIAGNOSIS — R00.2 PALPITATIONS: ICD-10-CM

## 2025-04-28 DIAGNOSIS — I48.0 PAROXYSMAL ATRIAL FIBRILLATION: ICD-10-CM

## 2025-04-28 LAB
ALBUMIN SERPL ELPH-MCNC: 3.4 G/DL — SIGNIFICANT CHANGE UP (ref 3.3–5.2)
ALP SERPL-CCNC: 39 U/L — LOW (ref 40–120)
ALT FLD-CCNC: 16 U/L — SIGNIFICANT CHANGE UP
ANION GAP SERPL CALC-SCNC: 10 MMOL/L — SIGNIFICANT CHANGE UP (ref 5–17)
AST SERPL-CCNC: 21 U/L — SIGNIFICANT CHANGE UP
BILIRUB DIRECT SERPL-MCNC: 0.2 MG/DL — SIGNIFICANT CHANGE UP (ref 0–0.3)
BILIRUB INDIRECT FLD-MCNC: 0.3 MG/DL — SIGNIFICANT CHANGE UP (ref 0.2–1)
BILIRUB SERPL-MCNC: 0.5 MG/DL — SIGNIFICANT CHANGE UP (ref 0.4–2)
BUN SERPL-MCNC: 11 MG/DL — SIGNIFICANT CHANGE UP (ref 8–20)
CALCIUM SERPL-MCNC: 8 MG/DL — LOW (ref 8.4–10.5)
CHLORIDE SERPL-SCNC: 109 MMOL/L — HIGH (ref 96–108)
CO2 SERPL-SCNC: 25 MMOL/L — SIGNIFICANT CHANGE UP (ref 22–29)
CREAT SERPL-MCNC: 1 MG/DL — SIGNIFICANT CHANGE UP (ref 0.5–1.3)
EGFR: 77 ML/MIN/1.73M2 — SIGNIFICANT CHANGE UP
EGFR: 77 ML/MIN/1.73M2 — SIGNIFICANT CHANGE UP
GLUCOSE SERPL-MCNC: 105 MG/DL — HIGH (ref 70–99)
HCT VFR BLD CALC: 25.7 % — LOW (ref 39–50)
HCT VFR BLD CALC: 27 % — LOW (ref 39–50)
HGB BLD-MCNC: 8.6 G/DL — LOW (ref 13–17)
HGB BLD-MCNC: 9 G/DL — LOW (ref 13–17)
MCHC RBC-ENTMCNC: 30 PG — SIGNIFICANT CHANGE UP (ref 27–34)
MCHC RBC-ENTMCNC: 33.5 G/DL — SIGNIFICANT CHANGE UP (ref 32–36)
MCV RBC AUTO: 89.5 FL — SIGNIFICANT CHANGE UP (ref 80–100)
NRBC # BLD AUTO: 0 K/UL — SIGNIFICANT CHANGE UP (ref 0–0)
NRBC # FLD: 0 K/UL — SIGNIFICANT CHANGE UP (ref 0–0)
NRBC BLD AUTO-RTO: 0 /100 WBCS — SIGNIFICANT CHANGE UP (ref 0–0)
PLATELET # BLD AUTO: 173 K/UL — SIGNIFICANT CHANGE UP (ref 150–400)
PMV BLD: 10.2 FL — SIGNIFICANT CHANGE UP (ref 7–13)
POTASSIUM SERPL-MCNC: 3.9 MMOL/L — SIGNIFICANT CHANGE UP (ref 3.5–5.3)
POTASSIUM SERPL-SCNC: 3.9 MMOL/L — SIGNIFICANT CHANGE UP (ref 3.5–5.3)
PROT SERPL-MCNC: 4.9 G/DL — LOW (ref 6.6–8.7)
RBC # BLD: 2.87 M/UL — LOW (ref 4.2–5.8)
RBC # FLD: 14.7 % — HIGH (ref 10.3–14.5)
SODIUM SERPL-SCNC: 144 MMOL/L — SIGNIFICANT CHANGE UP (ref 135–145)
WBC # BLD: 5.92 K/UL — SIGNIFICANT CHANGE UP (ref 3.8–10.5)
WBC # FLD AUTO: 5.92 K/UL — SIGNIFICANT CHANGE UP (ref 3.8–10.5)

## 2025-04-28 PROCEDURE — 99233 SBSQ HOSP IP/OBS HIGH 50: CPT

## 2025-04-28 PROCEDURE — 99223 1ST HOSP IP/OBS HIGH 75: CPT

## 2025-04-28 PROCEDURE — 99232 SBSQ HOSP IP/OBS MODERATE 35: CPT

## 2025-04-28 RX ADMIN — Medication 500 MILLILITER(S): at 08:47

## 2025-04-28 RX ADMIN — Medication 40 MILLIGRAM(S): at 17:59

## 2025-04-28 RX ADMIN — Medication 40 MILLIGRAM(S): at 05:13

## 2025-04-28 RX ADMIN — MONTELUKAST SODIUM 10 MILLIGRAM(S): 10 TABLET ORAL at 12:12

## 2025-04-28 RX ADMIN — ATORVASTATIN CALCIUM 10 MILLIGRAM(S): 80 TABLET, FILM COATED ORAL at 21:57

## 2025-04-28 RX ADMIN — AMLODIPINE BESYLATE 5 MILLIGRAM(S): 10 TABLET ORAL at 05:13

## 2025-04-28 RX ADMIN — ESCITALOPRAM OXALATE 10 MILLIGRAM(S): 20 TABLET ORAL at 12:12

## 2025-04-28 RX ADMIN — TAMSULOSIN HYDROCHLORIDE 0.4 MILLIGRAM(S): 0.4 CAPSULE ORAL at 21:57

## 2025-04-28 RX ADMIN — LOSARTAN POTASSIUM 100 MILLIGRAM(S): 100 TABLET, FILM COATED ORAL at 05:13

## 2025-04-28 NOTE — CONSULT NOTE ADULT - ASSESSMENT
Assessment/Recommendations:   80 year old male with HTN, hyperlipidemia, GERD, obesity BMI 33.2, AMADO on CPAP, CKD stage 3, chronic back pain, and presumed AF on Eliquis. He presented to [facility] ED with multiple episodes of black/bloody stools. CT abdomen showed pancolonic diverticulosis without diverticulitis, and no evidence of active GIB. Initial H/H 10.3/31.3 with downward trend to 7.3/22.4 now s/p 3 units PRBC. Eliquis has been held.   Regarding his AF history, pt reportedly had "possible AF" lasting < 1 minute during sleep study 8/2024 for which he was started on Eliquis. He monitors his heart rhythm w/ frequent [employer] mobile checks, and no AF has been detected. Holter monitor from 2/24-3/8/25 showed predominant rhythm NSR with 5 brief episodes of AT, longest 10 beats; one 9 beat NSVT episode; no AF or AFL appreciated. EP consult requested for further evaluation.   Inpatient telemetry shows NSR and sinus bradycardia, occasional PVCs.     No further AF has been detected since the episode lasting < 1 minute during sleep study 8/2024. At this point, a definitive diagnosis of paroxysmal AF has not been made.   Pt would benefit from ILR implant for long term arrhythmia surveillance to help guide further management. Agreeable for loop implant prior to d/c.   Ok to remain off AC at this time.     Will discuss w/ Dr. Mendoza 
Patient with lower extremity edema and now with lower GI bleeding.  Most likely diverticular bleeding as recent colonoscopy was fairly unimpressive.  Hold anticoagulation.  Monitor CBC.  IV fluid hydration.  Obtain CT abdomen with bleeding protocol.  If continues to have active bleeding, may need IR intervention.  Supportive care.  Packed RBC transfusion as needed to keep hemoglobin hematocrit more than 8/24.  Discussed with the ER attending.

## 2025-04-28 NOTE — PROGRESS NOTE ADULT - NS ATTEND AMEND GEN_ALL_CORE FT
Patient seen and examined.  Patient with history of rectal bleeding.  No further bleeding.  Will recommend to advance diet.  Monitor CBC.  Resumption of anticoagulation should be based on cardiology and primary team assessment.

## 2025-04-28 NOTE — PROGRESS NOTE ADULT - ASSESSMENT
Assessment and Plan:  79yo M with PMHx of AMADO + CPAP, HTN, HLD, obesity, GERD, CKD3b, chronic back pain, Afib on Eliquis admitted with GIB   CT has not shown any evidence of active bleeding but he has diverticulosis.  Last colonoscopy was about a month ago. As per patient, bleeding noted with stools are minimal today and much improved from prior episodes. Low threshold for repeat colonoscopy at this time.    #ABLA  #Rectal bleeding  likely secondary to diverticulosis  - Monitor Hgb, transfuse for hgb <8g/dL, monitor for further signs of overt GI bleeding   -Tolerating CLD, can advance as tolerated

## 2025-04-28 NOTE — CHART NOTE - NSCHARTNOTEFT_GEN_A_CORE
pt. offered to be placed on cpap.  pt. declined stating that he is awake.  cpap on s/b
Patient found off CPAP and is currently refusing. Patient was encouraged to stay on CPAP overnight but is refusing. Patient is stable and breathing comfortably. RN is aware.
Patient is not on unit at this time.  BIPAP machine is off and at bedside.
Patient is stable breathing comfortably. Patient will go on CPAP later tonight for nocturnal support.
Patient was placed on CPAP for nocturnal support with F&P mask. No skin breakdown was noted. Allevyn was appied to the bridge of the nose. Will continue to assess skin integrity and apply appropriate intervention as needed. Patient currently tolerating CPAP well with no visible signs of distress noted.
Pt examined laying in bed  States he feels better. Still light headed and weak when ambulates  Hemodynamically stable  DOAC on hold at present.   Interested in ARIN ligation vs watchman discussions   Pt has outpt f/u with EP on Monday. Will consult EP in am as pt may likely remain admitted.  Repeat labs at 8 pm. Transfuse for Hb < 8
Pt with hemoglobin 7.9  Received 1u PRBCs today.   All vital signs stable.   Consents in place.   Active type and screen.   1Unit PRBCs ordered to keep hemoglobin >8  Repeat CBC in am.
patient  refused bipap. pt was educated on risks vs benefits. However ,pt still refused after being informed & educated . pt prefers his home machine and state when he gets to a floor he will bring his own in.  Vital signs stable Unit at bedside should he change his mind RN Aware. as is attending

## 2025-04-28 NOTE — PROGRESS NOTE ADULT - SUBJECTIVE AND OBJECTIVE BOX
[facility] Division of Hospital Medicine    Chief Complaint:  GIB     SUBJECTIVE / OVERNIGHT EVENTS:  Pt examined laying in bed  H/H dropped again overnight. s/p repeated PRBC transfusion   Reports that had a small amount of dark BM this am   Patient denies chest pain, SOB, abd pain, N/V, fever, chills, dysuria or any other complaints. All remainder ROS negative.     MEDICATIONS  (STANDING):  amLODIPine   Tablet 5 milliGRAM(s) Oral daily  atorvastatin 10 milliGRAM(s) Oral at bedtime  escitalopram 10 milliGRAM(s) Oral daily  losartan 100 milliGRAM(s) Oral daily  montelukast 10 milliGRAM(s) Oral daily  pantoprazole  Injectable 40 milliGRAM(s) IV Push two times a day  tamsulosin 0.4 milliGRAM(s) Oral at bedtime    MEDICATIONS  (PRN):  ondansetron Injectable 4 milliGRAM(s) IV Push every 6 hours PRN Nausea  oxycodone    5 mG/acetaminophen 325 mG 1 Tablet(s) Oral every 6 hours PRN Moderate Pain (4 - 6)        PHYSICAL EXAM:  Vital Signs Last 24 Hrs  T(C): 36.8 (28 Apr 2025 08:24), Max: 37.2 (27 Apr 2025 19:52)  T(F): 98.3 (28 Apr 2025 08:24), Max: 98.9 (27 Apr 2025 19:52)  HR: 70 (28 Apr 2025 08:24) (70 - 106)  BP: 135/70 (28 Apr 2025 08:24) (113/63 - 154/65)  BP(mean): --  RR: 16 (28 Apr 2025 08:24) (16 - 18)  SpO2: 97% (28 Apr 2025 08:24) (96% - 98%)    Parameters below as of 28 Apr 2025 08:24  Patient On (Oxygen Delivery Method): room air      CONSTITUTIONAL: Non toxic appearing, lying in bed  ENMT: Moist oral mucosa, no pharyngeal injection or exudates; normal dentition  RESPIRATORY: Normal respiratory effort; lungs are clear to auscultation bilaterally  CARDIOVASCULAR: Regular rate and rhythm, normal S1 and S2, no murmur/rub/gallop; No lower extremity edema; Peripheral pulses are 2+ bilaterally  ABDOMEN: Nontender to palpation, normoactive bowel sounds, no rebound/guarding; No hepatosplenomegaly  MUSCLOSKELETAL:   no clubbing or cyanosis of digits; no joint swelling or tenderness to palpation  PSYCH: A+O to person, place, and time; affect appropriate  NEUROLOGY: CN 2-12 are intact and symmetric; no gross sensory deficits;   SKIN: No rashes; no palpable lesions    LABS:                            PT/INR - ( 26 Apr 2025 02:00 )   PT: 13.5 sec;   INR: 1.20 ratio         PTT - ( 26 Apr 2025 02:00 )  PTT:33.7 sec      Urinalysis Basic - ( 27 Apr 2025 04:31 )    Color: x / Appearance: x / SG: x / pH: x  Gluc: 109 mg/dL / Ketone: x  / Bili: x / Urobili: x   Blood: x / Protein: x / Nitrite: x   Leuk Esterase: x / RBC: x / WBC x   Sq Epi: x / Non Sq Epi: x / Bacteria: x        Urinalysis with Rflx Culture (collected 25 Apr 2025 13:26)      CAPILLARY BLOOD GLUCOSE            MICRO:        RADIOLOGY & ADDITIONAL TESTS:

## 2025-04-28 NOTE — CONSULT NOTE ADULT - SUBJECTIVE AND OBJECTIVE BOX
[facility]   [facility]   Office: [address] [place] [address]   Telephone: [phone]  Fax: [phone]                     Electrophysiology Consult Note                                                                                                                                          Consult requested by: Dr. Velazquez  Reason for Consultation: ILR consideration   History obtained by: pt and EMR    obtained: n/a                                                          HPI:  80 year old male with HTN, hyperlipidemia, GERD, obesity BMI 33.2, AMADO on CPAP, CKD stage 3, chronic back pain, and presumed AF on Eliquis. He presented to [facility] ED with multiple episodes of black/bloody stools. CT abdomen showed pancolonic diverticulosis without diverticulitis, and no evidence of active GIB. Initial H/H 10.3/31.3 with downward trend to 7.3/22.4 now s/p 3 units PRBC. Eliquis has been held.   Regarding his AF history, pt reportedly had "possible AF" lasting < 1 minute during sleep study 8/2024 for which he was started on Eliquis. He monitors his heart rhythm w/ frequent [employer] mobile checks, and no AF has been detected. Holter monitor from 2/24-3/8/25 showed predominant rhythm NSR with 5 brief episodes of AT, longest 10 beats; one 9 beat NSVT episode; no AF or AFL appreciated. EP consult requested for further evaluation.   Inpatient telemetry shows NSR and sinus bradycardia, occasional PVCs.     PAST MEDICAL & SURGICAL HISTORY:  Hypertension  GERD (gastroesophageal reflux disease)  Shoulder pain, left  Hyperlipidemia  S/P colonoscopy negative  S/P TURP 2008  S/P appendectomy 1953  S/P T&A (status post tonsillectomy and adenoidectomy)    MEDICATIONS:   amLODIPine   Tablet 5 milliGRAM(s) Oral daily  atorvastatin 10 milliGRAM(s) Oral at bedtime  escitalopram 10 milliGRAM(s) Oral daily  losartan 100 milliGRAM(s) Oral daily  montelukast 10 milliGRAM(s) Oral daily  ondansetron Injectable 4 milliGRAM(s) IV Push every 6 hours PRN  oxycodone    5 mG/acetaminophen 325 mG 1 Tablet(s) Oral every 6 hours PRN  pantoprazole  Injectable 40 milliGRAM(s) IV Push two times a day  tamsulosin 0.4 milliGRAM(s) Oral at bedtime    Allergies:  No Known Allergies    SOCIAL HISTORY: retired, lives with spouse     FAMILY HISTORY:  Family history of type 2 diabetes mellitus (Mother, Sibling)  Family history of stroke (Mother)  Family history of pacemaker (Father)  Family history of hypertension (Father)  Family history of COPD (chronic obstructive pulmonary disease) (Father)  Family history of heart disease (Father)    Review of Systems:  CONSTITUTIONAL: No fever, weight loss, + fatigue   EYES: No eye pain, visual disturbances, or discharge  ENMT:  No difficulty hearing, tinnitus, vertigo; No sinus or throat pain  NECK: No pain or stiffness  RESPIRATORY: No cough, wheezing, chills or hemoptysis; No shortness of breath  CARDIOVASCULAR: No chest pain, palpitations, dizziness, or leg swelling  GASTROINTESTINAL: No abdominal or epigastric pain. No nausea, vomiting, or hematemesis; No diarrhea or constipation. No melena or hematochezia.  GENITOURINARY: No dysuria, frequency, hematuria, or incontinence  NEUROLOGICAL: No headaches, memory loss, loss of strength, numbness, or tremors  SKIN: No itching, burning, rashes, or lesions   ENDOCRINE: No heat or cold intolerance; No hair loss  MUSCULOSKELETAL: No joint pain or swelling; No muscle, back, or extremity pain  PSYCHIATRIC: No depression, anxiety, mood swings, or difficulty sleeping  HEME/LYMPH: No easy bruising, or bleeding gums  ALLERGY AND IMMUNOLOGIC: No hives or eczema    Vital Signs Last 24 Hrs  T(C): 36.8 (28 Apr 2025 08:24), Max: 37.2 (27 Apr 2025 19:52)  T(F): 98.3 (28 Apr 2025 08:24), Max: 98.9 (27 Apr 2025 19:52)  HR: 70 (28 Apr 2025 08:24) (70 - 106)  BP: 135/70 (28 Apr 2025 08:24) (113/63 - 154/65)  RR: 16 (28 Apr 2025 08:24) (16 - 18)  SpO2: 97% (28 Apr 2025 08:24) (96% - 98%)    Parameters below as of 28 Apr 2025 08:24  Patient On (Oxygen Delivery Method): room air    Physical Exam:  Constitutional: awake, alert, no obvious distress   Neck: supple, No JVD  Cardiovascular: +S1S2 RRR, no murmurs, rubs, gallops   Pulmonary: CTA b/l, unlabored, no wheezes, rales  Abdomen: +BS, soft NTND  Extremities: no edema b/l, +distal pulses b/l  Skin: warm, dry, no rash   Neuro: non focal, speech clear    LABS:                        8.6    5.92  )-----------( 173      ( 28 Apr 2025 05:52 )             25.7   04-28    144  |  109[H]  |  11.0  ----------------------------<  105[H]  3.9   |  25.0  |  1.00    Ca    8.0[L]      28 Apr 2025 05:52  Mg     2.1     04-27    TPro  4.9[L]  /  Alb  3.4  /  TBili  0.5  /  DBili  0.2  /  AST  21  /  ALT  16  /  AlkPhos  39[L]  04-28  LIVER FUNCTIONS - ( 28 Apr 2025 05:52 )  Alb: 3.4 g/dL / Pro: 4.9 g/dL / ALK PHOS: 39 U/L / ALT: 16 U/L / AST: 21 U/L / GGT: x           Urinalysis Basic - ( 28 Apr 2025 05:52 )    Color: x / Appearance: x / SG: x / pH: x  Gluc: 105 mg/dL / Ketone: x  / Bili: x / Urobili: x   Blood: x / Protein: x / Nitrite: x   Leuk Esterase: x / RBC: x / WBC x   Sq Epi: x / Non Sq Epi: x / Bacteria: x    RADIOLOGY & ADDITIONAL STUDIES:  Stress test: 9/2023: 5METS, HR max 148 bpm, 103% MPHR, negative for ischemia   Echo: 9/8/23: LVEF 55-60%, trivial AR/TR

## 2025-04-28 NOTE — PROGRESS NOTE ADULT - SUBJECTIVE AND OBJECTIVE BOX
Chief Complaint:  Patient is a 80y old  Male who presents with a chief complaint of GIB (27 Apr 2025 15:43)      HPI/ 24 hr events: Patient seen and examined at bedside. Pt feeling well this morning. Tolerating diet. Last BM this am, states small amount of dark blood noted. Denies nausea, vomiting, diarrhea, abdominal pain, hematemesis, fever/chills. Vitals are overall stable, no leukocytosis, Hgb 8.6.       REVIEW OF SYSTEMS:   All other review of systems negative, except as noted in HPI    MEDICATIONS:   MEDICATIONS  (STANDING):  amLODIPine   Tablet 5 milliGRAM(s) Oral daily  atorvastatin 10 milliGRAM(s) Oral at bedtime  escitalopram 10 milliGRAM(s) Oral daily  losartan 100 milliGRAM(s) Oral daily  montelukast 10 milliGRAM(s) Oral daily  pantoprazole  Injectable 40 milliGRAM(s) IV Push two times a day  tamsulosin 0.4 milliGRAM(s) Oral at bedtime    MEDICATIONS  (PRN):  ondansetron Injectable 4 milliGRAM(s) IV Push every 6 hours PRN Nausea  oxycodone    5 mG/acetaminophen 325 mG 1 Tablet(s) Oral every 6 hours PRN Moderate Pain (4 - 6)    Packed Red Cells Order:  1 Unit  Indication: Hgb <8 gm/dL -Stable Cardiovascular Disease or Acute Co  Infuse Unit : 3 Hours (04-27-25 @ 21:36)  Packed Red Cells Order:  1 Unit  Indication: Anemia due to Active Hemorrhage - Medical Conditions e.  Infuse Unit : 3 Hours (04-27-25 @ 08:51)  Packed Red Cells Order:  1 Unit  Indication: Anemia due to Active Hemorrhage - Medical Conditions e.  Infuse Unit : 3.5 Hours (04-26-25 @ 01:30)    DIET:  Diet, Clear Liquid (04-26-25 @ 09:23) [Active]    ALLERGIES:   Allergies    No Known Allergies    Intolerances        VITAL SIGNS:   Vital Signs Last 24 Hrs  T(C): 36.8 (28 Apr 2025 08:24), Max: 37.4 (27 Apr 2025 13:02)  T(F): 98.3 (28 Apr 2025 08:24), Max: 99.4 (27 Apr 2025 13:02)  HR: 70 (28 Apr 2025 08:24) (70 - 106)  BP: 135/70 (28 Apr 2025 08:24) (113/63 - 154/65)  BP(mean): --  RR: 16 (28 Apr 2025 08:24) (16 - 18)  SpO2: 97% (28 Apr 2025 08:24) (96% - 98%)    Parameters below as of 28 Apr 2025 08:24  Patient On (Oxygen Delivery Method): room air      I&O's Summary    27 Apr 2025 07:01  -  28 Apr 2025 07:00  --------------------------------------------------------  IN: 691 mL / OUT: 600 mL / NET: 91 mL      PHYSICAL EXAM:   GENERAL:  No acute distress  HEENT:  NC/AT, conjunctiva clear, sclera anicteric  CHEST:  No increased effort  HEART:  Regular rate  ABDOMEN:  Soft, non-tender, non-distended, no rebound or guarding  EXTREMITIES: No edema  SKIN:  Warm, dry  NEURO:  Calm, cooperative    LABS:                        8.6    5.92  )-----------( 173      ( 28 Apr 2025 05:52 )             25.7     Hemoglobin: 8.6 g/dL (04-28-25 @ 05:52)  Hemoglobin: 7.9 g/dL (04-27-25 @ 20:57)  Hemoglobin: 7.3 g/dL (04-27-25 @ 04:31)  Hemoglobin: 8.1 g/dL (04-26-25 @ 20:00)  Hemoglobin: 8.9 g/dL (04-26-25 @ 02:00)  Hemoglobin: 9.0 g/dL (04-25-25 @ 22:06)  Hemoglobin: 9.7 g/dL (04-25-25 @ 17:03)  Hemoglobin: 10.3 g/dL (04-25-25 @ 14:00)    04-28    144  |  109[H]  |  11.0  ----------------------------<  105[H]  3.9   |  25.0  |  1.00    Ca    8.0[L]      28 Apr 2025 05:52  Mg     2.1     04-27    TPro  4.9[L]  /  Alb  3.4  /  TBili  0.5  /  DBili  0.2  /  AST  21  /  ALT  16  /  AlkPhos  39[L]  04-28    LIVER FUNCTIONS - ( 28 Apr 2025 05:52 )  Alb: 3.4 g/dL / Pro: 4.9 g/dL / ALK PHOS: 39 U/L / ALT: 16 U/L / AST: 21 U/L / GGT: x             Urinalysis with Rflx Culture (collected 25 Apr 2025 13:26)      Triglycerides, Serum: 108 mg/dL (04-26-25 @ 02:00)      RADIOLOGY & ADDITIONAL STUDIES:      ACC: [id number] EXAM:  CT ABDOMEN AND PELVIS WAW IC   ORDERED BY: TONIA GRULLON     PROCEDURE DATE:  04/25/2025          INTERPRETATION:  CLINICAL INFORMATION: 80 years  Male with rectal   bleeding.    ADDITIONAL CLINICAL INFORMATION: Other, Non-specified    COMPARISON: None.    CONTRAST/COMPLICATIONS:  IV Contrast: Omnipaque 350  100 cc administered   0 cc discarded  Oral Contrast: NONE  .    PROCEDURE:  CT of the Abdomen and Pelvis was performed.  Precontrast, Arterial and Delayed phases wereperformed.  Sagittal and coronal reformats were performed.    FINDINGS:  LOWER CHEST: Aortic and coronary atherosclerosis.    LIVER: Within normal limits.  BILE DUCTS: Normal caliber.  GALLBLADDER: Within normal limits.  SPLEEN: Within normal limits.  PANCREAS: Within normal limits.  ADRENALS: Within normal limits.  KIDNEYS/URETERS: Punctate nonobstructing calculus in each kidney versus   vascular calcification. Bilateral cysts measuring up to 1.6 cm. No   hydronephrosis. Symmetrical nephrograms.Nonspecific bilateral   perinephric fat stranding.    BLADDER: Within normal limits.  REPRODUCTIVE ORGANS: Markedly enlarged prostate measuring 6.7 x 5.2 x 6.3   cm. 2.1 cm hypodense nodule at the base of the prostate (11:138). With   projection intothe bladder base.    BOWEL: No bowel obstruction. Appendix  . Pancolonic diverticulosis   without diverticulitis. No intraluminal contrast extravasation.  PERITONEUM/RETROPERITONEUM: Within normal limits.  VESSELS: Within normal limits.  LYMPH NODES:No lymphadenopathy.  ABDOMINAL WALL: Small fat-containing left inguinal hernia.  BONES: Thoracic degenerative changes.    IMPRESSION:  No CT evidence of active GI bleed.    Pancolonic diverticulosis without diverticulitis.    2.1 cm hypodense nodule at the prostate base. Enlarged prostate projects   into the bladder. Recommend urology evaluation and possible prostate MRI.    --- End of Report ---    JULIEN ERNANDEZ MD; Attending Radiologist  This document has been electronically signed. Apr 25 2025  9:08PM  04-25-25 @ 20:21

## 2025-04-28 NOTE — CONSULT NOTE ADULT - NS ATTEND AMEND GEN_ALL_CORE FT
pt with unclear history of AF as above- no definitive diagnosis made.   Given his acute GI bleeding, risks of anticoagulation outweigh the benefits for now.   I recommend further monitoring to clarify if he had paroxysmal AF, and overall arrhythmia burden. If prolonged episodes of AF are noted in the future, would reconsider anticoagulation vs possible LAAO procedure.   Would stop Eliquis for now.   continue telemetry.   Plan for ILR implant prior to discharge for long-term monitoring- possibly tomorrow.

## 2025-04-28 NOTE — PROGRESS NOTE ADULT - ASSESSMENT
80 M with AMADO + CPAP, HTN, HLD, obesity, GERD, reported CKD, chronic back pain (Oxycodone, joint injections), Afib on Eliquis admitted with GIB     Acute blood loss anemia   Likely secondary to diverticulosis   Recently had a colonoscopy  s/p 3 units now since admission   Orthostatics pos 18:00  Tolerating CLD   GI on board   Transfuse to maintain Hgb >8  Repeat H/H 21:00    Hypocalcemia   Now planned for 2nd unit today  Give Calcium gluconate to avoid citrate binding effect    HTN/Afib   DOAC on hold due to bleed  CHADVASC 3   c/w losartan, amlodipine   d/w EP. Reportedly with unconfirmed true Afib.   ILR eval   Continue telemetry     LE edema  Held Lasix due to active exsanguination  Strict I/Os      Chronic pain   Lumbar disc degeneration  osteoarthritis  Follows with orthopedic surgery and PMNR   C/w oxycodone PRN    Depression  c/w escitalopram     HLD  c/w atorvastatin     BPH  Enlarged prostate seen on CT   c/w tamsulosin     DVT: encourage mobility   Diet: CLD  Dispo: Pending stable H/H and AC challenge

## 2025-04-29 ENCOUNTER — TRANSCRIPTION ENCOUNTER (OUTPATIENT)
Age: 80
End: 2025-04-29

## 2025-04-29 VITALS
OXYGEN SATURATION: 99 % | SYSTOLIC BLOOD PRESSURE: 150 MMHG | RESPIRATION RATE: 14 BRPM | DIASTOLIC BLOOD PRESSURE: 66 MMHG | HEART RATE: 88 BPM

## 2025-04-29 LAB
ANION GAP SERPL CALC-SCNC: 13 MMOL/L — SIGNIFICANT CHANGE UP (ref 5–17)
BUN SERPL-MCNC: 9.9 MG/DL — SIGNIFICANT CHANGE UP (ref 8–20)
CALCIUM SERPL-MCNC: 8.5 MG/DL — SIGNIFICANT CHANGE UP (ref 8.4–10.5)
CHLORIDE SERPL-SCNC: 108 MMOL/L — SIGNIFICANT CHANGE UP (ref 96–108)
CO2 SERPL-SCNC: 24 MMOL/L — SIGNIFICANT CHANGE UP (ref 22–29)
CREAT SERPL-MCNC: 1.07 MG/DL — SIGNIFICANT CHANGE UP (ref 0.5–1.3)
EGFR: 71 ML/MIN/1.73M2 — SIGNIFICANT CHANGE UP
EGFR: 71 ML/MIN/1.73M2 — SIGNIFICANT CHANGE UP
GLUCOSE SERPL-MCNC: 105 MG/DL — HIGH (ref 70–99)
HCT VFR BLD CALC: 30.4 % — LOW (ref 39–50)
HGB BLD-MCNC: 10.1 G/DL — LOW (ref 13–17)
MCHC RBC-ENTMCNC: 30.2 PG — SIGNIFICANT CHANGE UP (ref 27–34)
MCHC RBC-ENTMCNC: 33.2 G/DL — SIGNIFICANT CHANGE UP (ref 32–36)
MCV RBC AUTO: 91 FL — SIGNIFICANT CHANGE UP (ref 80–100)
NRBC # BLD AUTO: 0 K/UL — SIGNIFICANT CHANGE UP (ref 0–0)
NRBC # FLD: 0 K/UL — SIGNIFICANT CHANGE UP (ref 0–0)
NRBC BLD AUTO-RTO: 0 /100 WBCS — SIGNIFICANT CHANGE UP (ref 0–0)
PLATELET # BLD AUTO: 251 K/UL — SIGNIFICANT CHANGE UP (ref 150–400)
PMV BLD: 10.2 FL — SIGNIFICANT CHANGE UP (ref 7–13)
POTASSIUM SERPL-MCNC: 4 MMOL/L — SIGNIFICANT CHANGE UP (ref 3.5–5.3)
POTASSIUM SERPL-SCNC: 4 MMOL/L — SIGNIFICANT CHANGE UP (ref 3.5–5.3)
RBC # BLD: 3.34 M/UL — LOW (ref 4.2–5.8)
RBC # FLD: 14.7 % — HIGH (ref 10.3–14.5)
SODIUM SERPL-SCNC: 145 MMOL/L — SIGNIFICANT CHANGE UP (ref 135–145)
WBC # BLD: 7.75 K/UL — SIGNIFICANT CHANGE UP (ref 3.8–10.5)
WBC # FLD AUTO: 7.75 K/UL — SIGNIFICANT CHANGE UP (ref 3.8–10.5)

## 2025-04-29 PROCEDURE — 85025 COMPLETE CBC W/AUTO DIFF WBC: CPT

## 2025-04-29 PROCEDURE — 33285 INSJ SUBQ CAR RHYTHM MNTR: CPT

## 2025-04-29 PROCEDURE — C1764: CPT

## 2025-04-29 PROCEDURE — 84100 ASSAY OF PHOSPHORUS: CPT

## 2025-04-29 PROCEDURE — 80053 COMPREHEN METABOLIC PANEL: CPT

## 2025-04-29 PROCEDURE — 86850 RBC ANTIBODY SCREEN: CPT

## 2025-04-29 PROCEDURE — 80061 LIPID PANEL: CPT

## 2025-04-29 PROCEDURE — 94660 CPAP INITIATION&MGMT: CPT

## 2025-04-29 PROCEDURE — 74178 CT ABD&PLV WO CNTR FLWD CNTR: CPT | Mod: MC

## 2025-04-29 PROCEDURE — 82803 BLOOD GASES ANY COMBINATION: CPT

## 2025-04-29 PROCEDURE — 80076 HEPATIC FUNCTION PANEL: CPT

## 2025-04-29 PROCEDURE — 83880 ASSAY OF NATRIURETIC PEPTIDE: CPT

## 2025-04-29 PROCEDURE — 85027 COMPLETE CBC AUTOMATED: CPT

## 2025-04-29 PROCEDURE — 84295 ASSAY OF SERUM SODIUM: CPT

## 2025-04-29 PROCEDURE — 86923 COMPATIBILITY TEST ELECTRIC: CPT

## 2025-04-29 PROCEDURE — 86901 BLOOD TYPING SEROLOGIC RH(D): CPT

## 2025-04-29 PROCEDURE — 99239 HOSP IP/OBS DSCHRG MGMT >30: CPT

## 2025-04-29 PROCEDURE — 84484 ASSAY OF TROPONIN QUANT: CPT

## 2025-04-29 PROCEDURE — 83036 HEMOGLOBIN GLYCOSYLATED A1C: CPT

## 2025-04-29 PROCEDURE — 81003 URINALYSIS AUTO W/O SCOPE: CPT

## 2025-04-29 PROCEDURE — 36430 TRANSFUSION BLD/BLD COMPNT: CPT

## 2025-04-29 PROCEDURE — 85730 THROMBOPLASTIN TIME PARTIAL: CPT

## 2025-04-29 PROCEDURE — 82947 ASSAY GLUCOSE BLOOD QUANT: CPT

## 2025-04-29 PROCEDURE — 82435 ASSAY OF BLOOD CHLORIDE: CPT

## 2025-04-29 PROCEDURE — 85018 HEMOGLOBIN: CPT

## 2025-04-29 PROCEDURE — 85610 PROTHROMBIN TIME: CPT

## 2025-04-29 PROCEDURE — 93005 ELECTROCARDIOGRAM TRACING: CPT

## 2025-04-29 PROCEDURE — 99285 EMERGENCY DEPT VISIT HI MDM: CPT | Mod: 25

## 2025-04-29 PROCEDURE — 86900 BLOOD TYPING SEROLOGIC ABO: CPT

## 2025-04-29 PROCEDURE — 82330 ASSAY OF CALCIUM: CPT

## 2025-04-29 PROCEDURE — 80048 BASIC METABOLIC PNL TOTAL CA: CPT

## 2025-04-29 PROCEDURE — 83605 ASSAY OF LACTIC ACID: CPT

## 2025-04-29 PROCEDURE — 83735 ASSAY OF MAGNESIUM: CPT

## 2025-04-29 PROCEDURE — 36415 COLL VENOUS BLD VENIPUNCTURE: CPT

## 2025-04-29 PROCEDURE — 85014 HEMATOCRIT: CPT

## 2025-04-29 PROCEDURE — 93970 EXTREMITY STUDY: CPT

## 2025-04-29 PROCEDURE — 83690 ASSAY OF LIPASE: CPT

## 2025-04-29 PROCEDURE — 84132 ASSAY OF SERUM POTASSIUM: CPT

## 2025-04-29 PROCEDURE — P9016: CPT

## 2025-04-29 RX ORDER — ASPIRIN 325 MG
1 TABLET ORAL
Qty: 30 | Refills: 0
Start: 2025-04-29 | End: 2025-05-28

## 2025-04-29 RX ORDER — CEFAZOLIN SODIUM IN 0.9 % NACL 3 G/100 ML
2000 INTRAVENOUS SOLUTION, PIGGYBACK (ML) INTRAVENOUS ONCE
Refills: 0 | Status: DISCONTINUED | OUTPATIENT
Start: 2025-04-29 | End: 2025-04-29

## 2025-04-29 RX ORDER — CEFAZOLIN SODIUM IN 0.9 % NACL 3 G/100 ML
2000 INTRAVENOUS SOLUTION, PIGGYBACK (ML) INTRAVENOUS ONCE
Refills: 0 | Status: COMPLETED | OUTPATIENT
Start: 2025-04-29 | End: 2025-04-29

## 2025-04-29 RX ORDER — APIXABAN 2.5 MG/1
1 TABLET, FILM COATED ORAL
Refills: 0 | DISCHARGE

## 2025-04-29 RX ORDER — CIMETIDINE 400 MG
1 TABLET ORAL
Refills: 0 | DISCHARGE

## 2025-04-29 RX ORDER — FUROSEMIDE 10 MG/ML
1 INJECTION INTRAMUSCULAR; INTRAVENOUS
Qty: 0 | Refills: 0 | DISCHARGE

## 2025-04-29 RX ADMIN — LOSARTAN POTASSIUM 100 MILLIGRAM(S): 100 TABLET, FILM COATED ORAL at 05:09

## 2025-04-29 RX ADMIN — MONTELUKAST SODIUM 10 MILLIGRAM(S): 10 TABLET ORAL at 13:43

## 2025-04-29 RX ADMIN — AMLODIPINE BESYLATE 5 MILLIGRAM(S): 10 TABLET ORAL at 05:10

## 2025-04-29 RX ADMIN — Medication 40 MILLIGRAM(S): at 05:10

## 2025-04-29 RX ADMIN — Medication 2000 MILLIGRAM(S): at 15:53

## 2025-04-29 NOTE — DISCHARGE NOTE PROVIDER - CARE PROVIDERS DIRECT ADDRESSES
[email or web address]

## 2025-04-29 NOTE — PROGRESS NOTE ADULT - PROVIDER SPECIALTY LIST ADULT
Internal Medicine
Electrophysiology
Gastroenterology
Internal Medicine
Gastroenterology
Gastroenterology

## 2025-04-29 NOTE — DISCHARGE NOTE NURSING/CASE MANAGEMENT/SOCIAL WORK - PATIENT PORTAL LINK FT
You can access the FollowMyHealth Patient Portal offered by [facility] by registering at the following website: [email or web address]. By joining [facility]’s FollowMyHealth portal, you will also be able to view your health information using other applications (apps) compatible with our system.

## 2025-04-29 NOTE — DISCHARGE NOTE PROVIDER - NSDCCPCAREPLAN_GEN_ALL_CORE_FT
PRINCIPAL DISCHARGE DIAGNOSIS  Diagnosis: GI bleed  Assessment and Plan of Treatment: High fiber diet.      SECONDARY DISCHARGE DIAGNOSES  Diagnosis: Prostate nodule  Assessment and Plan of Treatment:

## 2025-04-29 NOTE — PROGRESS NOTE ADULT - SUBJECTIVE AND OBJECTIVE BOX
Procedure: MDT ILR implant   Implanting physician: Milton Mendoza MD    Pt doing well s/p loop recorder implant. Denies complaint.     Incision: Dermabond C/D/I; no bleeding, hematoma, erythema, exudate or edema    Assessment:   80 year old male with HTN, hyperlipidemia, GERD, obesity BMI 33.2, AMADO on CPAP, CKD stage 3, chronic back pain, and presumed AF on Eliquis. He presented to [facility] ED with multiple episodes of black/bloody stools. CT abdomen showed pancolonic diverticulosis without diverticulitis, and no evidence of active GIB. Initial H/H 10.3/31.3 with downward trend to 7.3/22.4 now s/p 3 units PRBC. Eliquis has been held.   Regarding his AF history, pt reportedly had "possible AF" lasting < 1 minute during sleep study 8/2024 for which he was started on Eliquis. He monitors his heart rhythm w/ frequent Kardia mobile checks, and no AF has been detected. Holter monitor from 2/24-3/8/25 showed predominant rhythm NSR with 5 brief episodes of AT, longest 10 beats; one 9 beat NSVT episode; no AF or AFL appreciated. EP consult requested for further evaluation.   Inpatient telemetry shows NSR and sinus bradycardia, occasional PVCs.     No further AF has been detected since the episode lasting < 1 minute during sleep study 8/2024. At this point, a definitive diagnosis of paroxysmal AF has not been made. Now status post ILR implant for long term arrhythmia surveillance to help guide further management.     Plan:   Loop Recorder Incision Care:     - Do not touch the incision until it is completely healed.   - Keep the incision dry for 24 hours.   - There is Dermabond (skin glue) and/or Steristrips (white bandages) on your incision, which will start to flake off on its own over the next 2-3 weeks. Do not pick at or peel off the Dermabond/Steristrips.   - Do not apply soaps, creams, lotions, ointments or powders to the incision until it is completely healed.  - You should call the doctor if you notice redness, drainage, swelling, increased tenderness, hot sensation around the incision, bleeding or incision edges pulling apart.    Outpatient follow up with Dr. Mendoza in 3-4 weeks, or sooner if needed.

## 2025-04-29 NOTE — DISCHARGE NOTE PROVIDER - CARE PROVIDER_API CALL
Lebron Hernandez  Gastroenterology  [address], [address]  [place] [address]  Phone: [phone]  Fax: [phone]  Follow Up Time:     Milton Mendoza  Cardiac Electrophysiology  [address] [place] [id number]  Phone: [phone]  Fax: [phone]  Follow Up Time:

## 2025-04-29 NOTE — DISCHARGE NOTE NURSING/CASE MANAGEMENT/SOCIAL WORK - FINANCIAL ASSISTANCE
[facility] provides services at a reduced cost to those who are determined to be eligible through [facility]’s financial assistance program. Information regarding [facility]’s financial assistance program can be found by going to [email or web address] or by calling [phone].

## 2025-04-29 NOTE — DISCHARGE NOTE PROVIDER - NSDCMRMEDTOKEN_GEN_ALL_CORE_FT
amLODIPine 5 mg oral tablet: 1 tab(s) orally once a day  escitalopram 10 mg oral tablet: 1 tab(s) orally once a day  furosemide 20 mg oral tablet: 1 tab(s) orally every other day  losartan 100 mg oral tablet: 1 tab(s) orally once a day  montelukast 10 mg oral tablet: 1 tab(s) orally once a day  Percocet 5/325 325 mg-5 mg oral tablet: 1 tab(s) orally every 6 hours, As Needed MDD:4  pravastatin 40 mg oral tablet: 1 tab(s) orally once a day  tamsulosin 0.4 mg oral capsule: 1 cap(s) orally once a day   amLODIPine 5 mg oral tablet: 1 tab(s) orally once a day  aspirin 81 mg oral delayed release tablet: 1 tab(s) orally once a day  escitalopram 10 mg oral tablet: 1 tab(s) orally once a day  furosemide 20 mg oral tablet: 1 tab(s) orally every other day  losartan 100 mg oral tablet: 1 tab(s) orally once a day  montelukast 10 mg oral tablet: 1 tab(s) orally once a day  Percocet 5/325 325 mg-5 mg oral tablet: 1 tab(s) orally every 6 hours, As Needed MDD:4  pravastatin 40 mg oral tablet: 1 tab(s) orally once a day  tamsulosin 0.4 mg oral capsule: 1 cap(s) orally once a day

## 2025-04-29 NOTE — DISCHARGE NOTE PROVIDER - NSDCCPTREATMENT_GEN_ALL_CORE_FT
PRINCIPAL PROCEDURE  Procedure: Insertion, loop recorder  Findings and Treatment: Loop Recorder Incision Care:     - Do not touch the incision until it is completely healed.   - Keep the incision dry for 24 hours.   - There is Dermabond (skin glue) and/or Steristrips (white bandages) on your incision, which will start to flake off on its own over the next 2-3 weeks. Do not pick at or peel off the Dermabond/Steristrips.   - Do not apply soaps, creams, lotions, ointments or powders to the incision until it is completely healed.  - You should call the doctor if you notice redness, drainage, swelling, increased tenderness, hot sensation around the incision, bleeding or incision edges pulling apart.

## 2025-04-29 NOTE — DISCHARGE NOTE PROVIDER - HOSPITAL COURSE
80 M with AMADO + CPAP, HTN, HLD, obesity, GERD, reported CKD, chronic back pain h/o reported Afib who was admitted with acute GI blood loss anemia secondary to diverticulosis. Was transfused 3 units PRBCs. Now with stable H/H. D.w EP at length. As he has had no Afib on telemetry and per prior records had reported solitary episode in 2024 now planned for ILR placement and discharge with outpt f/u with EP

## 2025-04-29 NOTE — DISCHARGE NOTE PROVIDER - NSDCFUADDINST_GEN_ALL_CORE_FT
Follow up with Dr. Mendoza in 3-4 weeks, or sooner if needed. Our office will contact you in 3-5 days to schedule this appointment. Please call [phone] with questions or concerns.

## 2025-05-14 PROBLEM — Z95.818 IMPLANTABLE LOOP RECORDER PRESENT: Status: ACTIVE | Noted: 2025-05-14

## 2025-05-19 ENCOUNTER — APPOINTMENT (OUTPATIENT)
Dept: ELECTROPHYSIOLOGY | Facility: CLINIC | Age: 80
End: 2025-05-19
Payer: MEDICARE

## 2025-05-19 ENCOUNTER — NON-APPOINTMENT (OUTPATIENT)
Age: 80
End: 2025-05-19

## 2025-05-19 VITALS
SYSTOLIC BLOOD PRESSURE: 122 MMHG | DIASTOLIC BLOOD PRESSURE: 62 MMHG | OXYGEN SATURATION: 97 % | HEIGHT: 72 IN | WEIGHT: 230 LBS | BODY MASS INDEX: 31.15 KG/M2 | HEART RATE: 74 BPM

## 2025-05-19 DIAGNOSIS — Z95.818 PRESENCE OF OTHER CARDIAC IMPLANTS AND GRAFTS: ICD-10-CM

## 2025-05-19 DIAGNOSIS — I48.0 PAROXYSMAL ATRIAL FIBRILLATION: ICD-10-CM

## 2025-05-19 PROCEDURE — 99214 OFFICE O/P EST MOD 30 MIN: CPT | Mod: 25

## 2025-05-19 PROCEDURE — 93000 ELECTROCARDIOGRAM COMPLETE: CPT | Mod: 59

## 2025-06-03 ENCOUNTER — APPOINTMENT (OUTPATIENT)
Dept: GASTROENTEROLOGY | Facility: CLINIC | Age: 80
End: 2025-06-03

## 2025-06-09 ENCOUNTER — APPOINTMENT (OUTPATIENT)
Dept: CARDIOLOGY | Facility: CLINIC | Age: 80
End: 2025-06-09
Payer: MEDICARE

## 2025-06-09 VITALS
OXYGEN SATURATION: 95 % | HEART RATE: 59 BPM | DIASTOLIC BLOOD PRESSURE: 64 MMHG | WEIGHT: 238 LBS | HEIGHT: 72 IN | BODY MASS INDEX: 32.23 KG/M2 | SYSTOLIC BLOOD PRESSURE: 127 MMHG

## 2025-06-09 PROCEDURE — 93000 ELECTROCARDIOGRAM COMPLETE: CPT

## 2025-06-09 PROCEDURE — 99204 OFFICE O/P NEW MOD 45 MIN: CPT | Mod: 25

## 2025-06-23 ENCOUNTER — APPOINTMENT (OUTPATIENT)
Dept: ELECTROPHYSIOLOGY | Facility: CLINIC | Age: 80
End: 2025-06-23
Payer: MEDICARE

## 2025-06-23 ENCOUNTER — NON-APPOINTMENT (OUTPATIENT)
Age: 80
End: 2025-06-23

## 2025-06-23 PROCEDURE — 93298 REM INTERROG DEV EVAL SCRMS: CPT

## 2025-06-26 ENCOUNTER — APPOINTMENT (OUTPATIENT)
Dept: CARDIOLOGY | Facility: CLINIC | Age: 80
End: 2025-06-26
Payer: MEDICARE

## 2025-06-26 PROCEDURE — 93306 TTE W/DOPPLER COMPLETE: CPT

## 2025-07-08 ENCOUNTER — NON-APPOINTMENT (OUTPATIENT)
Age: 80
End: 2025-07-08

## 2025-07-18 ENCOUNTER — NON-APPOINTMENT (OUTPATIENT)
Age: 80
End: 2025-07-18

## 2025-07-18 ENCOUNTER — APPOINTMENT (OUTPATIENT)
Dept: OPHTHALMOLOGY | Facility: CLINIC | Age: 80
End: 2025-07-18
Payer: MEDICARE

## 2025-07-18 PROCEDURE — 92060 SENSORIMOTOR EXAMINATION: CPT

## 2025-07-18 PROCEDURE — 99204 OFFICE O/P NEW MOD 45 MIN: CPT

## 2025-07-28 ENCOUNTER — NON-APPOINTMENT (OUTPATIENT)
Age: 80
End: 2025-07-28

## 2025-07-28 ENCOUNTER — APPOINTMENT (OUTPATIENT)
Dept: ELECTROPHYSIOLOGY | Facility: CLINIC | Age: 80
End: 2025-07-28
Payer: MEDICARE

## 2025-07-28 PROCEDURE — 93298 REM INTERROG DEV EVAL SCRMS: CPT

## 2025-08-29 ENCOUNTER — NON-APPOINTMENT (OUTPATIENT)
Age: 80
End: 2025-08-29

## 2025-08-29 ENCOUNTER — APPOINTMENT (OUTPATIENT)
Dept: ELECTROPHYSIOLOGY | Facility: CLINIC | Age: 80
End: 2025-08-29

## 2025-08-29 PROCEDURE — 93298 REM INTERROG DEV EVAL SCRMS: CPT
